# Patient Record
Sex: MALE | Race: WHITE | NOT HISPANIC OR LATINO
[De-identification: names, ages, dates, MRNs, and addresses within clinical notes are randomized per-mention and may not be internally consistent; named-entity substitution may affect disease eponyms.]

---

## 2020-07-20 PROBLEM — Z00.00 ENCOUNTER FOR PREVENTIVE HEALTH EXAMINATION: Status: ACTIVE | Noted: 2020-07-20

## 2020-09-03 ENCOUNTER — APPOINTMENT (OUTPATIENT)
Dept: SURGERY | Facility: CLINIC | Age: 40
End: 2020-09-03
Payer: COMMERCIAL

## 2020-09-03 VITALS
OXYGEN SATURATION: 97 % | HEIGHT: 68.5 IN | SYSTOLIC BLOOD PRESSURE: 126 MMHG | BODY MASS INDEX: 47.19 KG/M2 | HEART RATE: 107 BPM | TEMPERATURE: 97.9 F | DIASTOLIC BLOOD PRESSURE: 85 MMHG | WEIGHT: 315 LBS

## 2020-09-03 DIAGNOSIS — Z72.0 TOBACCO USE: ICD-10-CM

## 2020-09-03 DIAGNOSIS — E66.01 MORBID (SEVERE) OBESITY DUE TO EXCESS CALORIES: ICD-10-CM

## 2020-09-03 DIAGNOSIS — Z01.818 ENCOUNTER FOR OTHER PREPROCEDURAL EXAMINATION: ICD-10-CM

## 2020-09-03 DIAGNOSIS — G47.33 OBSTRUCTIVE SLEEP APNEA (ADULT) (PEDIATRIC): ICD-10-CM

## 2020-09-03 DIAGNOSIS — K21.9 GASTRO-ESOPHAGEAL REFLUX DISEASE W/OUT ESOPHAGITIS: ICD-10-CM

## 2020-09-03 PROCEDURE — 99205 OFFICE O/P NEW HI 60 MIN: CPT

## 2020-09-03 RX ORDER — ALPRAZOLAM 0.25 MG/1
0.25 TABLET ORAL
Refills: 0 | Status: ACTIVE | COMMUNITY

## 2020-09-03 NOTE — PHYSICAL EXAM
[Obese, well nourished, in no acute distress] : obese, well nourished, in no acute distress [Normal] : affect appropriate [de-identified] : Normoactive bowel sounds, no hepatosplenomegaly, no masses, non-tender. [de-identified] : Understood consultation

## 2020-09-03 NOTE — CONSULT LETTER
[Dear  ___] : Dear  [unfilled], [Consult Letter:] : I had the pleasure of evaluating your patient, [unfilled]. [Please see my note below.] : Please see my note below. [Consult Closing:] : Thank you very much for allowing me to participate in the care of this patient.  If you have any questions, please do not hesitate to contact me. [Sincerely,] : Sincerely, [FreeTextEntry3] : Jerome Ramey MD, FACS, FASMBS\par , Department of Surgery [facility]\par Director of Metabolic and Bariatric Surgery [facility]\par Director of Metabolic and Bariatric Surgery, and Robotic Minimally Invasive Surgery at [facility]

## 2020-09-03 NOTE — HISTORY OF PRESENT ILLNESS
[de-identified] : The patient is a 39 year-old morbidly obese man, 5feet 8.5 inches, 349 lbs. (BMI=52). The patient presents requesting weight loss surgery. She has been more than 80 bs. overweight for the past 10 years and is currently 75 lbs greater than his greatest weight. JESSICA has lost up to 20 lbs. on more than one occasion. \par \par The patient has tried numerous weight loss programs including keto, weight watchers & self-directed and physician supervised diets. JESSICA has not taken weight loss medication due to known side effects. \par \par The patient reports denies diabetes, has shortness of breath with exertion (walking stairs) , denies weight bearing joint pain, has lower back pain. \par JESSICA denies active airway disease + YOUSIF (uses CPAP nightly). He denies difficulty sleeping. He reports denies kidney, urinary tract disease, incontinence. He denies erectile dysfunction. He denies headache, dizziness, seizure or neurological disorders. \par He denies untreated thyroid, adrenal, pituitary disease. JESSICA has depression denies other psychiatric disorders. The patient denies gallstones, has heartburn, denies ulcers &  denies liver disease. \par He denies high blood pressure, denies high cholesterol, denies history of heart attack or stroke. He  denies anemia, denies bleeding disorders, thrombosis, clotting disorder or easy bruisability. JESSICA denies peripheral edema. \par \par

## 2020-09-03 NOTE — ASSESSMENT
[FreeTextEntry1] : The patient is a morbidly obese man, (BMI=52), with significant weight related comorbidity including: Obstructive sleep apnea; unable to lose weight and improve his co-morbid conditions with medical management including diet, exercise and weight loss medication.

## 2021-02-01 ENCOUNTER — APPOINTMENT (OUTPATIENT)
Dept: INFECTIOUS DISEASE | Facility: CLINIC | Age: 41
End: 2021-02-01

## 2021-02-02 ENCOUNTER — LABORATORY RESULT (OUTPATIENT)
Age: 41
End: 2021-02-02

## 2021-02-02 ENCOUNTER — APPOINTMENT (OUTPATIENT)
Dept: INFECTIOUS DISEASE | Facility: CLINIC | Age: 41
End: 2021-02-02
Payer: COMMERCIAL

## 2021-02-02 VITALS
RESPIRATION RATE: 16 BRPM | DIASTOLIC BLOOD PRESSURE: 77 MMHG | HEART RATE: 97 BPM | HEIGHT: 70 IN | SYSTOLIC BLOOD PRESSURE: 114 MMHG | WEIGHT: 315 LBS | BODY MASS INDEX: 45.1 KG/M2 | OXYGEN SATURATION: 95 % | TEMPERATURE: 98.7 F

## 2021-02-02 PROCEDURE — 99072 ADDL SUPL MATRL&STAF TM PHE: CPT

## 2021-02-02 PROCEDURE — 99205 OFFICE O/P NEW HI 60 MIN: CPT

## 2021-02-02 RX ORDER — MUPIROCIN 20 MG/G
2 OINTMENT TOPICAL
Qty: 3 | Refills: 3 | Status: ACTIVE | COMMUNITY
Start: 2021-02-02 | End: 1900-01-01

## 2021-02-02 RX ORDER — BUPROPION HYDROCHLORIDE 100 MG/1
TABLET, FILM COATED ORAL
Refills: 0 | Status: DISCONTINUED | COMMUNITY
End: 2021-02-02

## 2021-02-02 NOTE — PHYSICAL EXAM
[General Appearance - Alert] : alert [General Appearance - In No Acute Distress] : in no acute distress [Sclera] : the sclera and conjunctiva were normal [PERRL With Normal Accommodation] : pupils were equal in size, round, reactive to light [Extraocular Movements] : extraocular movements were intact [Outer Ear] : the ears and nose were normal in appearance [Oropharynx] : the oropharynx was normal with no thrush [Neck Cervical Mass (___cm)] : no neck mass was observed [Neck Appearance] : the appearance of the neck was normal [Jugular Venous Distention Increased] : there was no jugular-venous distention [Thyroid Diffuse Enlargement] : the thyroid was not enlarged [Auscultation Breath Sounds / Voice Sounds] : lungs were clear to auscultation bilaterally [Heart Rate And Rhythm] : heart rate was normal and rhythm regular [Heart Sounds] : normal S1 and S2 [Heart Sounds Gallop] : no gallops [Murmurs] : no murmurs [Heart Sounds Pericardial Friction Rub] : no pericardial rub [Edema] : there was no peripheral edema [No Palpable Adenopathy] : no palpable adenopathy [Musculoskeletal - Swelling] : no joint swelling [Nail Clubbing] : no clubbing  or cyanosis of the fingernails [Motor Tone] : muscle strength and tone were normal [Skin Color & Pigmentation] : normal skin color and pigmentation [] : no rash [FreeTextEntry1] : small furuncle left jaw with induration [Affect] : the affect was normal [Oriented To Time, Place, And Person] : oriented to person, place, and time

## 2021-02-02 NOTE — CONSULT LETTER
[Dear  ___] : Dear  [unfilled], [Consult Letter:] : I had the pleasure of evaluating your patient, [unfilled]. [Please see my note below.] : Please see my note below. [Consult Closing:] : Thank you very much for allowing me to participate in the care of this patient.  If you have any questions, please do not hesitate to contact me. [Sincerely,] : Sincerely, [FreeTextEntry2] : Dr Gilles Gutierrez\par [address]\[place] [address] [FreeTextEntry3] : Carlos Harrison MD, FACP, SOWMYA, AAHIVM\par Infectious Diseases\par [facility]

## 2021-02-02 NOTE — ASSESSMENT
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 The sites of colonization and the risk factors for increased bioburden were reviewed. A combination program was discussed at considerable length including use of Minocycline at the conclusion of the current tmp-sx course.\par Intranasal muproicin twice daily, dialy hibiclens showers with prolonged skin contact for prolonged antisepsis, coordinated laundering of all underclothes and towels and request that contacts undergo hibiclens showers was discussed and written material was provided. He was instructed to perfrom this protocol for 7 days out of the month for 3 consecutive months.\par \par lab testing including white blood cell count, Hgb A1C, HIV screen is being done to assess for medical factors increasing staph colonization. Continued weight loss was encouraged. [Treatment Education] : treatment education [Rx Dose / Side Effects] : Rx dose/side effects [Risk Reduction] : risk reduction [Medical Care Issues] : medical care issues

## 2021-02-02 NOTE — HISTORY OF PRESENT ILLNESS
[FreeTextEntry1] : This pleasant 40 year old man has been in good health except for obesity.\par remote history of pilonidal cyst\par He currently weighs 339#  BMI = 48.64 - He loast 10# since 9/3/20. 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 He had increased wbc - required I&D and was hospitalized at [facility] for about 1 week on iv antibiotics.\par \par 12/25/20 _ recurrent abscess on scrotum. I&D yielded MRSA Vanco NAI= 1 Susc to clinda, TMP-Sx,Tetra, Linezolid. A few GNRs were present. He was treated with Cefepime and LInezolid and discharged on po Cefpodoxime/Linezolid.\par \par On about 1/22/21, he developed painful right axillary abscess. He underwent drainage at [facility] and is at the end of course of Trimethoprim-Sulfa\par \par He is concerned about follicultis at right jaw. He is good humored and without systemic complaints.

## 2021-02-03 LAB
ALBUMIN SERPL ELPH-MCNC: 4.6 G/DL
ALP BLD-CCNC: 73 U/L
ALT SERPL-CCNC: 32 U/L
ANION GAP SERPL CALC-SCNC: 14 MMOL/L
AST SERPL-CCNC: 20 U/L
BASOPHILS # BLD AUTO: 0.12 K/UL
BASOPHILS NFR BLD AUTO: 0.9 %
BILIRUB SERPL-MCNC: 0.2 MG/DL
BUN SERPL-MCNC: 13 MG/DL
CALCIUM SERPL-MCNC: 9.6 MG/DL
CHLORIDE SERPL-SCNC: 102 MMOL/L
CO2 SERPL-SCNC: 24 MMOL/L
CREAT SERPL-MCNC: 1.31 MG/DL
EOSINOPHIL # BLD AUTO: 0.35 K/UL
EOSINOPHIL NFR BLD AUTO: 2.6 %
ESTIMATED AVERAGE GLUCOSE: 120 MG/DL
GLUCOSE SERPL-MCNC: 93 MG/DL
HBA1C MFR BLD HPLC: 5.8 %
HCT VFR BLD CALC: 45.6 %
HGB BLD-MCNC: 15.1 G/DL
HIV1+2 AB SPEC QL IA.RAPID: NONREACTIVE
LYMPHOCYTES # BLD AUTO: 2.8 K/UL
LYMPHOCYTES NFR BLD AUTO: 21 %
MAN DIFF?: NORMAL
MCHC RBC-ENTMCNC: 30.4 PG
MCHC RBC-ENTMCNC: 33.1 GM/DL
MCV RBC AUTO: 91.9 FL
MONOCYTES # BLD AUTO: 0.59 K/UL
MONOCYTES NFR BLD AUTO: 4.4 %
NEUTROPHILS # BLD AUTO: 8.88 K/UL
NEUTROPHILS NFR BLD AUTO: 66.7 %
PLATELET # BLD AUTO: 268 K/UL
POTASSIUM SERPL-SCNC: 4.3 MMOL/L
PROT SERPL-MCNC: 7.8 G/DL
RBC # BLD: 4.96 M/UL
RBC # FLD: 13.6 %
SARS-COV-2 IGG SERPL IA-ACNC: 0.07 INDEX
SARS-COV-2 IGG SERPL QL IA: NEGATIVE
SODIUM SERPL-SCNC: 140 MMOL/L
WBC # FLD AUTO: 13.31 K/UL

## 2021-02-17 ENCOUNTER — NON-APPOINTMENT (OUTPATIENT)
Age: 41
End: 2021-02-17

## 2021-03-17 ENCOUNTER — INPATIENT (INPATIENT)
Facility: HOSPITAL | Age: 41
LOS: 2 days | Discharge: HOME | DRG: 580 | End: 2021-03-20
Attending: INTERNAL MEDICINE | Admitting: INTERNAL MEDICINE
Payer: COMMERCIAL

## 2021-03-17 ENCOUNTER — NON-APPOINTMENT (OUTPATIENT)
Age: 41
End: 2021-03-17

## 2021-03-17 VITALS
HEIGHT: 69 IN | SYSTOLIC BLOOD PRESSURE: 158 MMHG | TEMPERATURE: 98 F | OXYGEN SATURATION: 95 % | WEIGHT: 315 LBS | RESPIRATION RATE: 16 BRPM | DIASTOLIC BLOOD PRESSURE: 81 MMHG | HEART RATE: 87 BPM

## 2021-03-17 DIAGNOSIS — L03.211 CELLULITIS OF FACE: ICD-10-CM

## 2021-03-17 LAB
ALBUMIN SERPL ELPH-MCNC: 4 G/DL — SIGNIFICANT CHANGE UP (ref 3.3–5)
ALP SERPL-CCNC: 68 U/L — SIGNIFICANT CHANGE UP (ref 40–120)
ALT FLD-CCNC: 23 U/L — SIGNIFICANT CHANGE UP (ref 10–45)
ANION GAP SERPL CALC-SCNC: 13 MMOL/L — SIGNIFICANT CHANGE UP (ref 5–17)
APTT BLD: 32 SEC — SIGNIFICANT CHANGE UP (ref 27.5–35.5)
AST SERPL-CCNC: 15 U/L — SIGNIFICANT CHANGE UP (ref 10–40)
BASOPHILS # BLD AUTO: 0.1 K/UL — SIGNIFICANT CHANGE UP (ref 0–0.2)
BASOPHILS NFR BLD AUTO: 0.8 % — SIGNIFICANT CHANGE UP (ref 0–2)
BILIRUB SERPL-MCNC: 0.5 MG/DL — SIGNIFICANT CHANGE UP (ref 0.2–1.2)
BUN SERPL-MCNC: 16 MG/DL — SIGNIFICANT CHANGE UP (ref 7–23)
CALCIUM SERPL-MCNC: 9.7 MG/DL — SIGNIFICANT CHANGE UP (ref 8.4–10.5)
CHLORIDE SERPL-SCNC: 102 MMOL/L — SIGNIFICANT CHANGE UP (ref 96–108)
CO2 SERPL-SCNC: 22 MMOL/L — SIGNIFICANT CHANGE UP (ref 22–31)
CREAT SERPL-MCNC: 0.96 MG/DL — SIGNIFICANT CHANGE UP (ref 0.5–1.3)
EOSINOPHIL # BLD AUTO: 0.2 K/UL — SIGNIFICANT CHANGE UP (ref 0–0.5)
EOSINOPHIL NFR BLD AUTO: 1.7 % — SIGNIFICANT CHANGE UP (ref 0–6)
GLUCOSE SERPL-MCNC: 114 MG/DL — HIGH (ref 70–99)
HCT VFR BLD CALC: 42.4 % — SIGNIFICANT CHANGE UP (ref 39–50)
HGB BLD-MCNC: 14.8 G/DL — SIGNIFICANT CHANGE UP (ref 13–17)
IMM GRANULOCYTES NFR BLD AUTO: 0.4 % — SIGNIFICANT CHANGE UP (ref 0–1.5)
INR BLD: 1.07 RATIO — SIGNIFICANT CHANGE UP (ref 0.88–1.16)
LYMPHOCYTES # BLD AUTO: 18.6 % — SIGNIFICANT CHANGE UP (ref 13–44)
LYMPHOCYTES # BLD AUTO: 2.19 K/UL — SIGNIFICANT CHANGE UP (ref 1–3.3)
MCHC RBC-ENTMCNC: 31 PG — SIGNIFICANT CHANGE UP (ref 27–34)
MCHC RBC-ENTMCNC: 34.9 GM/DL — SIGNIFICANT CHANGE UP (ref 32–36)
MCV RBC AUTO: 88.9 FL — SIGNIFICANT CHANGE UP (ref 80–100)
MONOCYTES # BLD AUTO: 1.5 K/UL — HIGH (ref 0–0.9)
MONOCYTES NFR BLD AUTO: 12.7 % — SIGNIFICANT CHANGE UP (ref 2–14)
NEUTROPHILS # BLD AUTO: 7.73 K/UL — HIGH (ref 1.8–7.4)
NEUTROPHILS NFR BLD AUTO: 65.8 % — SIGNIFICANT CHANGE UP (ref 43–77)
NRBC # BLD: 0 /100 WBCS — SIGNIFICANT CHANGE UP (ref 0–0)
PLATELET # BLD AUTO: 213 K/UL — SIGNIFICANT CHANGE UP (ref 150–400)
POTASSIUM SERPL-MCNC: 4 MMOL/L — SIGNIFICANT CHANGE UP (ref 3.5–5.3)
POTASSIUM SERPL-SCNC: 4 MMOL/L — SIGNIFICANT CHANGE UP (ref 3.5–5.3)
PROT SERPL-MCNC: 7.6 G/DL — SIGNIFICANT CHANGE UP (ref 6–8.3)
PROTHROM AB SERPL-ACNC: 12.8 SEC — SIGNIFICANT CHANGE UP (ref 10.6–13.6)
RBC # BLD: 4.77 M/UL — SIGNIFICANT CHANGE UP (ref 4.2–5.8)
RBC # FLD: 12.7 % — SIGNIFICANT CHANGE UP (ref 10.3–14.5)
SARS-COV-2 RNA SPEC QL NAA+PROBE: SIGNIFICANT CHANGE UP
SODIUM SERPL-SCNC: 137 MMOL/L — SIGNIFICANT CHANGE UP (ref 135–145)
WBC # BLD: 11.77 K/UL — HIGH (ref 3.8–10.5)
WBC # FLD AUTO: 11.77 K/UL — HIGH (ref 3.8–10.5)

## 2021-03-17 PROCEDURE — 70487 CT MAXILLOFACIAL W/DYE: CPT | Mod: 26,MA

## 2021-03-17 PROCEDURE — 99285 EMERGENCY DEPT VISIT HI MDM: CPT

## 2021-03-17 PROCEDURE — 99222 1ST HOSP IP/OBS MODERATE 55: CPT

## 2021-03-17 RX ORDER — VANCOMYCIN HCL 1 G
1250 VIAL (EA) INTRAVENOUS ONCE
Refills: 0 | Status: COMPLETED | OUTPATIENT
Start: 2021-03-17 | End: 2021-03-17

## 2021-03-17 RX ORDER — VANCOMYCIN HCL 1 G
1250 VIAL (EA) INTRAVENOUS EVERY 8 HOURS
Refills: 0 | Status: DISCONTINUED | OUTPATIENT
Start: 2021-03-17 | End: 2021-03-18

## 2021-03-17 RX ORDER — KETOROLAC TROMETHAMINE 30 MG/ML
15 SYRINGE (ML) INJECTION ONCE
Refills: 0 | Status: DISCONTINUED | OUTPATIENT
Start: 2021-03-17 | End: 2021-03-17

## 2021-03-17 RX ORDER — OXYCODONE AND ACETAMINOPHEN 5; 325 MG/1; MG/1
1 TABLET ORAL EVERY 6 HOURS
Refills: 0 | Status: DISCONTINUED | OUTPATIENT
Start: 2021-03-17 | End: 2021-03-19

## 2021-03-17 RX ORDER — INFLUENZA VIRUS VACCINE 15; 15; 15; 15 UG/.5ML; UG/.5ML; UG/.5ML; UG/.5ML
0.5 SUSPENSION INTRAMUSCULAR ONCE
Refills: 0 | Status: DISCONTINUED | OUTPATIENT
Start: 2021-03-17 | End: 2021-03-20

## 2021-03-17 RX ORDER — MORPHINE SULFATE 50 MG/1
2 CAPSULE, EXTENDED RELEASE ORAL EVERY 6 HOURS
Refills: 0 | Status: DISCONTINUED | OUTPATIENT
Start: 2021-03-17 | End: 2021-03-18

## 2021-03-17 RX ORDER — MORPHINE SULFATE 50 MG/1
4 CAPSULE, EXTENDED RELEASE ORAL ONCE
Refills: 0 | Status: DISCONTINUED | OUTPATIENT
Start: 2021-03-17 | End: 2021-03-17

## 2021-03-17 RX ORDER — SODIUM CHLORIDE 9 MG/ML
1000 INJECTION INTRAMUSCULAR; INTRAVENOUS; SUBCUTANEOUS ONCE
Refills: 0 | Status: COMPLETED | OUTPATIENT
Start: 2021-03-17 | End: 2021-03-17

## 2021-03-17 RX ORDER — VANCOMYCIN HCL 1 G
VIAL (EA) INTRAVENOUS
Refills: 0 | Status: DISCONTINUED | OUTPATIENT
Start: 2021-03-17 | End: 2021-03-18

## 2021-03-17 RX ADMIN — Medication 15 MILLIGRAM(S): at 18:02

## 2021-03-17 RX ADMIN — Medication 100 MILLIGRAM(S): at 12:25

## 2021-03-17 RX ADMIN — MORPHINE SULFATE 2 MILLIGRAM(S): 50 CAPSULE, EXTENDED RELEASE ORAL at 19:43

## 2021-03-17 RX ADMIN — Medication 15 MILLIGRAM(S): at 16:14

## 2021-03-17 RX ADMIN — MORPHINE SULFATE 4 MILLIGRAM(S): 50 CAPSULE, EXTENDED RELEASE ORAL at 15:45

## 2021-03-17 RX ADMIN — Medication 600 MILLIGRAM(S): at 13:55

## 2021-03-17 RX ADMIN — Medication 166.67 MILLIGRAM(S): at 16:14

## 2021-03-17 RX ADMIN — Medication 1250 MILLIGRAM(S): at 18:02

## 2021-03-17 RX ADMIN — SODIUM CHLORIDE 1000 MILLILITER(S): 9 INJECTION INTRAMUSCULAR; INTRAVENOUS; SUBCUTANEOUS at 12:25

## 2021-03-17 RX ADMIN — OXYCODONE AND ACETAMINOPHEN 1 TABLET(S): 5; 325 TABLET ORAL at 21:32

## 2021-03-17 RX ADMIN — MORPHINE SULFATE 4 MILLIGRAM(S): 50 CAPSULE, EXTENDED RELEASE ORAL at 13:01

## 2021-03-17 RX ADMIN — Medication 166.67 MILLIGRAM(S): at 23:57

## 2021-03-17 NOTE — ED ADULT NURSE REASSESSMENT NOTE - NS ED NURSE REASSESS COMMENT FT1
Report received from Ewelina RN. Upon assessment, pt is AO x 4, speaking in full and complete sentences. Pt c/o head pressure and 9/10 facial pain. Morphine to be given. Pt updated on plan of care, awaiting inpatient bed. fall and safety precautions in place, pt in view of nurse's station.

## 2021-03-17 NOTE — ED PROVIDER NOTE - ATTENDING CONTRIBUTION TO CARE
Attending Statement (SARAH Porras MD):    HPI: 39 yo M with h/o obesity, recurrent skin/soft tissue infections and abscesses, and positive for MRSA colonization (follows with ID, Dr ZAMZAM Harrison), who presents for worsening pain, redness and swelling of the right face for the past 3 days; no fever/chills, no nausea/vomiting, no vision changes/double vision, no ear pain, no jaw pain, no difficulty opening/closing mouth, no difficulty speaking/swallowing.  States began 3 days ago with small pimple next to left side of nose, that he picked and drained small amount of puss, next day began having increasing redness; worsening today, prompting visit to ED      Review of Systems:  -General: no fever +chills  -ENT: no congestion, no difficulty swallowing (see hpi)  -Pulmonary: no cough, no shortness of breath  -Cardiac: no chest pain, no palpitations  -Gastrointestinal: no abdominal pain, no nausea, no vomiting, and no diarrhea.  -Genitourinary: no blood or pain with urination  -Musculoskeletal: no back or neck pain  -Skin:  see hpi  -Endocrine: No h/o diabetes or thyroid disease  -Neurologic: No focal weakness or numbness    All else negative unless otherwise specified elsewhere in this note.    PSH/PMH as noted above    On Physical Exam:  General: well appearing, in NAD, speaking clearly in full sentences and without difficulty; cooperative with exam  HEENT: PERRL, MMM; pustule noted bilateral to nose with area of erythema, induration and tenderness superior to pustule on R side of face with extending erythema up to periorbital area. posterior pharynx clear  Neck: no neck tenderness, no nuchal rigidity  Cardiac: normal s1, s2; RRR; no MGR  Lungs: CTABL  Abdomen: soft nontender/nondistended  : no bladder tenderness or distension  Skin: intact, no rash  Extremities: no peripheral edema, no gross deformities  Neuro: no gross neurologic deficits; EOMI without eliciting pain or diplopia    MDM: Concern for facial cellulitis; Attending Statement (SARAH Porras MD):    HPI: 41 yo M with h/o obesity, recurrent skin/soft tissue infections and abscesses, and positive for MRSA colonization (follows with ID, Dr ZAMZAM Harrison), who presents for worsening pain, redness and swelling of the right face for the past 3 days; no fever/chills, no nausea/vomiting, no vision changes/double vision, no ear pain, no jaw pain, no difficulty opening/closing mouth, no difficulty speaking/swallowing.  States began 3 days ago with small pimple next to left side of nose, that he picked and drained small amount of puss, next day began having increasing redness; worsening today, prompting visit to ED      Review of Systems:  -General: no fever +chills  -ENT: no congestion, no difficulty swallowing (see hpi)  -Pulmonary: no cough, no shortness of breath  -Cardiac: no chest pain, no palpitations  -Gastrointestinal: no abdominal pain, no nausea, no vomiting, and no diarrhea.  -Genitourinary: no blood or pain with urination  -Musculoskeletal: no back or neck pain  -Skin:  see hpi  -Endocrine: No h/o diabetes or thyroid disease  -Neurologic: No focal weakness or numbness    All else negative unless otherwise specified elsewhere in this note.    PSH/PMH as noted above    On Physical Exam:  General: well appearing, in NAD, speaking clearly in full sentences and without difficulty; cooperative with exam  HEENT: PERRL, MMM; pustule noted bilateral to nose with area of erythema, induration and tenderness superior to pustule on R side of face with extending erythema up to periorbital area. posterior pharynx clear  Neck: no neck tenderness, no nuchal rigidity  Cardiac: normal s1, s2; RRR; no MGR  Lungs: CTABL  Abdomen: soft nontender/nondistended  : no bladder tenderness or distension  Skin: intact, no rash  Extremities: no peripheral edema, no gross deformities  Neuro: no gross neurologic deficits; EOMI without eliciting pain or diplopia    MDM: Concern for facial cellulitis; likely MRSA given history; will obtain screening labs: cbc (to evaluate for leukocytosis or anemia), CMP (to evaluate for electrolyte abnormalities or renal/liver dysfunction) and blood cultures (low suspicion for sepsis or bacteremia given well appearing and afebrile, but is reporting chills).  Will also obtain CT max/face with iv contrast given swelling to evaluate for evidence of underlying abscess or osteomyelitis.  Start clindamycin empirically, and will consult with his ID physician for further recommendations; disposition pending results of labs/imaging and d/w ID.  -see progress notes above for updates to ED course and MDM.

## 2021-03-17 NOTE — ED PROVIDER NOTE - PHYSICAL EXAMINATION
GEN: Well Appearing, Nontoxic, NAD  HEENT: NC/AT, Symm Facies. pustule noted bilateral to nose with area of erythema, induration and tenderness superior to pustule on R side of face with extending erythema up to periorbital area. PERRL, EOMI, MMM, posterior pharynx clear  CV: No JVD/Bruits or stridor;  +S1S2, RRR w/o m/g/r  RESP: CTAB w/o w/r/r  ABD: Soft, nt/nd, +BS. No guarding/rebound. No RUQ tender, no CVAT  EXT/MSK: No lower extremity edema or calf tenderness. WWP, palpable pulses. FROMx4  SKIN: No erythema, lesions or rash  Neuro: Grossly intact, AOX3 with normal speech, CN II-XII intact; Sensation intact, motor 5/5 throughout. Gait normal

## 2021-03-17 NOTE — ED ADULT NURSE NOTE - CHIEF COMPLAINT QUOTE
Dx MRSA cellulitis s/p surgeries (at [facility]) to R groin (11/2020) and scrotum (12/25/2020), finished Cefpodoxime, Linezolid and Flagyl in January.  Then switched care to [facility] ID Dr. Perkins and started on Minocycline x 7 days for MRSA bacteremia (finished 3 weeks ago)  Here now for new swelling, redness to R face

## 2021-03-17 NOTE — ED ADULT TRIAGE NOTE - CHIEF COMPLAINT QUOTE
Dx MRSA cellulitis s/p surgeries (at [facility]) to R groin (11/2020) and scrotum (12/25/2020), finished Cefpodoxime, Linezolid and Flagyl in January.  Then switched care to [facility] ID Dr. Perkins and started on Minocycline x 7 days for MRSA bacteremia (finished 3 weeks ago)  Here now for new swelling, redness to R face

## 2021-03-17 NOTE — CONSULT NOTE ADULT - SUBJECTIVE AND OBJECTIVE BOX
Patient is a 40y old  Male who presents with a chief complaint of   HPI: 41yo M PMHx MRSA colonization (s/p minocycline x7d, on mupirocin nasal ointment and chlorhexidine baths), BMI 50 p/w spreading facial abscesses. Pt reports a pimple appeared on 3/13 on the L side of his nose. The pimple "popped and drained" but the pt noticed that more pimples were appearing. Since 3/13, he has developed two new pimples (one on each side of his nose). This morning, he noted that the pimple on the R side of his nose spread to his cheek and continues to spread cranially towards his eye. Denies photophobia, pain with eye movements, diplopia. Endorses mild headache       prior hospital charts reviewed [ x ]  primary team notes reviewed [ x ]  other consultant notes reviewed [ x ]    PAST MEDICAL & SURGICAL HISTORY:      Allergies  No Known Allergies    ANTIMICROBIALS (past 90 days)  MEDICATIONS  (STANDING):  clindamycin IVPB   100 mL/Hr IV Intermittent (03-17-21 @ 12:25)          OTHER MEDS: MEDICATIONS  (STANDING):    SOCIAL HISTORY:       FAMILY HISTORY:    REVIEW OF SYSTEMS  [  ] ROS unobtainable because:    [  ] All other systems negative except as noted below:	    Constitutional:  [ ] fever [ ] chills  [ ] weight loss  [ ] weakness  Skin:  [ ] rash [ ] phlebitis	  Eyes: [ ] icterus [ ] pain  [ ] discharge	  ENMT: [ ] sore throat  [ ] thrush [ ] ulcers [ ] exudates  Respiratory: [ ] dyspnea [ ] hemoptysis [ ] cough [ ] sputum	  Cardiovascular:  [ ] chest pain [ ] palpitations [ ] edema	  Gastrointestinal:  [ ] nausea [ ] vomiting [ ] diarrhea [ ] constipation [ ] pain	  Genitourinary:  [ ] dysuria [ ] frequency [ ] hematuria [ ] discharge [ ] flank pain  [ ] incontinence  Musculoskeletal:  [ ] myalgias [ ] arthralgias [ ] arthritis  [ ] back pain  Neurological:  [ ] headache [ ] seizures  [ ] confusion/altered mental status  Psychiatric:  [ ] anxiety [ ] depression	  Hematology/Lymphatics:  [ ] lymphadenopathy  Endocrine:  [ ] adrenal [ ] thyroid  Allergic/Immunologic:	 [ ] transplant [ ] seasonal    Vital Signs Last 24 Hrs  T(F): 98.7 (03-17-21 @ 11:47), Max: 98.7 (03-17-21 @ 11:47)  Vital Signs Last 24 Hrs  HR: 77 (03-17-21 @ 13:00) (77 - 87)  BP: 122/60 (03-17-21 @ 13:00) (121/63 - 158/81)  RR: 18 (03-17-21 @ 13:00)  SpO2: 98% (03-17-21 @ 13:00) (95% - 98%)  Wt(kg): --    PHYSICAL EXAM:  Constitutional: non-toxic, no distress  HEAD/EYES: anicteric, no conjunctival injection  ENT:  supple, no thrush  Cardiovascular:   normal S1, S2, no murmur, no edema  Respiratory:  clear BS bilaterally, no wheezes, no rales  GI:  soft, non-tender, normal bowel sounds  :  no hopkins, no CVA tenderness  Musculoskeletal:  no synovitis, normal ROM  Neurologic: awake and alert, normal strength, no focal findings  Skin:  no rash, no erythema, no phlebitis  Heme/Onc: no lymphadenopathy   Psychiatric:  awake, alert, appropriate mood                          14.8   11.77 )-----------( 213      ( 17 Mar 2021 12:19 )             42.4   03-17    137  |  102  |  16  ----------------------------<  114<H>  4.0   |  22  |  0.96    Ca    9.7      17 Mar 2021 12:19    TPro  7.6  /  Alb  4.0  /  TBili  0.5  /  DBili  x   /  AST  15  /  ALT  23  /  AlkPhos  68  03-17    MICROBIOLOGY:              RADIOLOGY:  imaging below personally reviewed and agree with findings Patient is a 40y old  Male who presents with a chief complaint of facial abscess.     HPI: 41yo M PMHx MRSA colonization (s/p minocycline x7d, on mupirocin nasal ointment and chlorhexidine baths), BMI 50 p/w spreading facial abscesses. Pt reports a pimple appeared on 3/13 on the L side of his nose. The pimple "popped and drained" but the pt noticed that more pimples were appearing. Since 3/13, he has developed two new pimples (one on each side of his nose). This morning, he noted that the pimple on the R side of his nose spread to his cheek and continues to spread towards his eye. Endorses headache and dental pain due to the pressure but denies photophobia, diplopia, or pain with eye movements. Denies fever, chills, cough, SOB, CP, abdominal pain, N/V, diarrhea, dysuria.    Per outpt notes, MRSA Vanco NAI=1 Susc to clindamycin, TMP-SMX, tetracycline, linezolid.  MRSA Vanco NAI= 1 Susc to clinda, TMP-Sx,Tetra, Linezolid.    prior hospital charts reviewed [ x ]  primary team notes reviewed [ x ]  other consultant notes reviewed [ x ]    PAST MEDICAL & SURGICAL HISTORY:      Allergies  No Known Allergies    ANTIMICROBIALS (past 90 days)  MEDICATIONS  (STANDING):  clindamycin IVPB   100 mL/Hr IV Intermittent (03-17-21 @ 12:25)          OTHER MEDS: MEDICATIONS  (STANDING):    SOCIAL HISTORY:       FAMILY HISTORY:    REVIEW OF SYSTEMS  [  ] ROS unobtainable because:    [  ] All other systems negative except as noted below:	    Constitutional:  [ ] fever [ ] chills  [ ] weight loss  [ ] weakness  Skin:  [ ] rash [ ] phlebitis	  Eyes: [ ] icterus [ ] pain  [ ] discharge	  ENMT: [ ] sore throat  [ ] thrush [ ] ulcers [ ] exudates  Respiratory: [ ] dyspnea [ ] hemoptysis [ ] cough [ ] sputum	  Cardiovascular:  [ ] chest pain [ ] palpitations [ ] edema	  Gastrointestinal:  [ ] nausea [ ] vomiting [ ] diarrhea [ ] constipation [ ] pain	  Genitourinary:  [ ] dysuria [ ] frequency [ ] hematuria [ ] discharge [ ] flank pain  [ ] incontinence  Musculoskeletal:  [ ] myalgias [ ] arthralgias [ ] arthritis  [ ] back pain  Neurological:  [ ] headache [ ] seizures  [ ] confusion/altered mental status  Psychiatric:  [ ] anxiety [ ] depression	  Hematology/Lymphatics:  [ ] lymphadenopathy  Endocrine:  [ ] adrenal [ ] thyroid  Allergic/Immunologic:	 [ ] transplant [ ] seasonal    Vital Signs Last 24 Hrs  T(F): 98.7 (03-17-21 @ 11:47), Max: 98.7 (03-17-21 @ 11:47)  Vital Signs Last 24 Hrs  HR: 77 (03-17-21 @ 13:00) (77 - 87)  BP: 122/60 (03-17-21 @ 13:00) (121/63 - 158/81)  RR: 18 (03-17-21 @ 13:00)  SpO2: 98% (03-17-21 @ 13:00) (95% - 98%)  Wt(kg): --    PHYSICAL EXAM:  Constitutional: non-toxic, no distress  HEAD/EYES: anicteric, no conjunctival injection  ENT:  supple, no thrush  Cardiovascular:   normal S1, S2, no murmur, no edema  Respiratory:  clear BS bilaterally, no wheezes, no rales  GI:  soft, non-tender, normal bowel sounds  :  no hopkins, no CVA tenderness  Musculoskeletal:  no synovitis, normal ROM  Neurologic: awake and alert, normal strength, no focal findings  Skin:  no rash, no erythema, no phlebitis  Heme/Onc: no lymphadenopathy   Psychiatric:  awake, alert, appropriate mood                          14.8   11.77 )-----------( 213      ( 17 Mar 2021 12:19 )             42.4   03-17    137  |  102  |  16  ----------------------------<  114<H>  4.0   |  22  |  0.96    Ca    9.7      17 Mar 2021 12:19    TPro  7.6  /  Alb  4.0  /  TBili  0.5  /  DBili  x   /  AST  15  /  ALT  23  /  AlkPhos  68  03-17    MICROBIOLOGY:              RADIOLOGY:  imaging below personally reviewed and agree with findings Patient is a 40y old  Male who presents with a chief complaint of facial abscess.     HPI: 39yo M PMHx MRSA colonization (s/p minocycline x7d, on mupirocin nasal ointment and chlorhexidine baths), BMI 50 p/w spreading facial abscesses. Pt reports a pimple appeared on 3/13 on the L side of his nose. The pimple "popped and drained" but the pt noticed that more pimples were appearing. Since 3/13, he has developed two new pimples (one on each side of his nose). This morning, he noted that the pimple on the R side of his nose spread to his cheek and continues to spread towards his eye. Endorses headache and dental pain due to the pressure but denies photophobia, diplopia, or pain with eye movements. Denies fever, chills, cough, SOB, CP, abdominal pain, N/V, diarrhea, dysuria. Pt never had abscesses prior to Nov 2020. Lives with his wife and [id number] son, neither of whom exhibit similar sxs.     Pt currently follows with Dr. Carlos Harrison in outpt ID. Per Dr. Harrison's note, sensitivities from [facility] are: MRSA Vanco NAI=1 Susc to clindamycin, TMP-SMX, tetracycline, linezolid.  MRSA Vanco NAI= 1 Susc to clinda, TMP-Sx,Tetra, Linezolid.    On arrival, T98 /81 HR87 SaO2 95% on RA. WBC 11.77. Pt received clinda x1 and morphine x1. Blood ctx pending.     prior hospital charts reviewed [ x ]  primary team notes reviewed [ x ]  other consultant notes reviewed [ x ]    PAST MEDICAL & SURGICAL HISTORY:    Allergies  No Known Allergies    ANTIMICROBIALS (past 90 days)  MEDICATIONS  (STANDING):  clindamycin IVPB   100 mL/Hr IV Intermittent (03-17-21 @ 12:25)    OTHER MEDS: MEDICATIONS  (STANDING):    SOCIAL HISTORY: Works in import/export of horses. Limited interaction with horses and mostly works from home now. Lives with wife and son at home. Former cigarette smoker (since 17yo ~0.5pack/day), currently vapes. Social alcohol use. No drug use.     FAMILY HISTORY:    REVIEW OF SYSTEMS  [  ] ROS unobtainable because:    [ x ] All other systems negative except as noted below:	    Constitutional:  [ ] fever [ ] chills  [ ] weight loss  [ ] weakness  Skin:  [x] rash [ ] phlebitis	  Eyes: [ ] icterus [ ] pain  [ ] discharge	  ENMT: [ ] sore throat  [ ] thrush [ ] ulcers [ ] exudates  Respiratory: [ ] dyspnea [ ] hemoptysis [ ] cough [ ] sputum	  Cardiovascular:  [ ] chest pain [ ] palpitations [ ] edema	  Gastrointestinal:  [ ] nausea [ ] vomiting [ ] diarrhea [ ] constipation [ ] pain	  Genitourinary:  [ ] dysuria [ ] frequency [ ] hematuria [ ] discharge [ ] flank pain  [ ] incontinence  Musculoskeletal:  [ ] myalgias [ ] arthralgias [ ] arthritis  [ ] back pain  Neurological:  [ ] headache [ ] seizures  [ ] confusion/altered mental status  Psychiatric:  [ ] anxiety [ ] depression	  Hematology/Lymphatics:  [ ] lymphadenopathy  Endocrine:  [ ] adrenal [ ] thyroid  Allergic/Immunologic:	 [ ] transplant [ ] seasonal    Vital Signs Last 24 Hrs  T(F): 98.7 (03-17-21 @ 11:47), Max: 98.7 (03-17-21 @ 11:47)  Vital Signs Last 24 Hrs  HR: 77 (03-17-21 @ 13:00) (77 - 87)  BP: 122/60 (03-17-21 @ 13:00) (121/63 - 158/81)  RR: 18 (03-17-21 @ 13:00)  SpO2: 98% on RA (03-17-21 @ 13:00) (95% - 98%)    PHYSICAL EXAM:  Constitutional: Awake, interactive, non-toxic, no acute distress, lying in hospital stretcher  HEAD/EYES: +erythematous, indurated patch overlying R cheek extending towards lower R eyelid. Two pustules noted on each side of the nose. No eyelid involvement, no chemosis, no proptosis. No conjunctival injection. Anicteric.   ENT: Slight tenderness to palpation of R submandibular lymph nodes. Lymphadenopathy was difficult to appreciate. No dental abnormalities  Cardiovascular: Regular rate and rhythm. S1, S2. No murmurs  Respiratory:  Clear BS bilaterally, no wheezes, no rales  GI:  Soft, non-tender, non-distended  : No Gauthier, no CVA tenderness  Neurologic: A&O x4  Skin: See description of face above. PIV x1 in L hand. No phlebitis. Tattoos on b/l upper and lower extremities.                           14.8   11.77 )-----------( 213      ( 17 Mar 2021 12:19 )             42.4   03-17    137  |  102  |  16  ----------------------------<  114<H>  4.0   |  22  |  0.96    Ca    9.7      17 Mar 2021 12:19    TPro  7.6  /  Alb  4.0  /  TBili  0.5  /  DBili  x   /  AST  15  /  ALT  23  /  AlkPhos  68  03-17      RADIOLOGY:  imaging below personally reviewed and agree with findings    c< from: CT Maxillofacial w/ IV Cont (03.17.21 @ 15:21) >  FINDINGS:    There is anterior infraorbital soft tissue thickening with some mild adjacent inflammatory stranding stenting down to the mandible. No abscess. There is no adjacent bone erosion. There is no post septal involvement in the orbit.    Evaluation of the osseous structures demonstrates no evidence of fracture.    The visualized sinuses demonstrate no evidence of opacification or mucosal thickening.    The nasopharynx is normal in appearance.    The parotid glands are normal and symmetric in appearance.    The  space is normal bilaterally.    Parapharyngeal space is normal bilaterally.    The tongue base is normal in appearance.    The epiglottis isnormal in thickness and appearance.    Worth tonsils are normal in appearance.    Sternocleidomastoid muscles are normal and symmetric in appearance bilaterally where visualized.    No suspicious adenopathy.      IMPRESSION:    There is a mild anterior intraorbital soft tissue thickening with mild adjacent inflammatory stranding extending down to the level of the mandible. No abscess. No adjacent bone erosion.    < end of copied text >

## 2021-03-17 NOTE — ED PROVIDER NOTE - OBJECTIVE STATEMENT
39 y/o M pmhx obesity, remote history of pilonidal cyst, recurrent abscess on scrotum. I&D yielded MRSA (Vanco NAI= 1 Susc to clinda, TMP-Sx,Tetra, Linezolid. A few GNRs were present. He was treated with Cefepime and LInezolid and discharged on po Cefpodoxime/Linezolid), 1/22/21 developed axillary abscess treated with bactrim, and most recently treated with minocycline for groin abscess. 41 y/o M pmhx obesity, remote history of pilonidal cyst, recurrent abscess on scrotum presenting with concern for facial infection worsening for 3 days. Patient underwent I&D of his scrotal abscess which yielded MRSA (Vanco NAI= 1 Susc to clinda, TMP-Sx,Tetra, Linezolid. A few GNRs were present,  treated with Cefepime and LInezolid and discharged on po Cefpodoxime/Linezolid), 1/22/21 developed axillary abscess treated with bactrim, and most recently established care with Dr. Escalona, treated with minocycline, intranasal mupirocin and daily hibiclens showers for colonization of MRSA (instructed to perform for 7 days /mo for 3 months). Patient reports that 3 days ago he noticed swelling and pimple developing on the L side of his nose; he picked at the area and drained small amount of pus from it and started to resolve. Yesterday noticed another pimple developing in the same location next to his nose on the right side, which he also picked at and tried to drain and then this morning woke up with significant swelling and area that is hard and red above this pimple. He reports significant pain to the touch of the area and this morning swelling was significant enough that it was making it difficult for him to open his eye. He reports this is much better now than it was, has not taken any NSAIDs or any medication for pain. Last night had temp of 99.4 and chills, and when noted the area to be worsening this morning called Dr. Harrison's office and was advised to come to the ER.

## 2021-03-17 NOTE — H&P ADULT - ASSESSMENT
41 y/o M pmhx obesity, remote history of pilonidal cyst, recurrent abscess on scrotum presenting with concern for facial infection worsening for 3 days. Patient underwent I&D of his scrotal abscess which yielded MRSA (Vanco NAI= 1 Susc to clinda, TMP-Sx,Tetra, Linezolid. A few GNRs were present,  treated with Cefepime and LInezolid and discharged on po Cefpodoxime/Linezolid), 1/22/21 developed axillary abscess treated with bactrim, and most recently established care with Dr. Escalona, treated with minocycline, intranasal mupirocin and daily hibiclens showers for colonization of MRSA (instructed to perform for 7 days /mo for 3 months). Patient reports that 3 days ago he noticed swelling and pimple developing on the L side of his nose; he picked at the area and drained small amount of pus from it and started to resolve. Yesterday noticed another pimple developing in the same location next to his nose on the right side, which he also picked at and tried to drain and then this morning woke up with significant swelling and area that is hard and red above this pimple. He reports significant pain to the touch of the area and this morning swelling was significant enough that it was making it difficult for him to open his eye. He reports this is much better now than it was, has not taken any NSAIDs or any medication for pain. Last night had temp of 99.4 and chills, and when noted the area to be worsening this morning so was advised to go to ER by his infectious disease doctor .

## 2021-03-17 NOTE — H&P ADULT - SKIN COMMENTS
----- Message from SALMA Pack sent at 9/10/2019  9:56 PM CDT -----  Labs will be discussed at upcoming appointment.     Cholesterol improved  Blood sugar normal  Thyroid function normal  Kidney function normal  Blood counts normal     right facial swelling with erythema ,tenderness and warm to touch

## 2021-03-17 NOTE — H&P ADULT - PROBLEM SELECTOR PLAN 1
With H/O Recurrent MRSA infection . ID help appreciated . Started on IV Vancomycin. Blood cultures and Nasal swab results pending. Pain meds . Will check HgbA1c .

## 2021-03-17 NOTE — ED PROVIDER NOTE - PROGRESS NOTE DETAILS
ID saw and evaluated patient recommending vancomycin and admission to the hospital. -Norma Sinha PA-C

## 2021-03-17 NOTE — H&P ADULT - HISTORY OF PRESENT ILLNESS
39 y/o M pmhx obesity, remote history of pilonidal cyst, recurrent abscess on scrotum presenting with concern for facial infection worsening for 3 days. Patient underwent I&D of his scrotal abscess which yielded MRSA (Vanco NAI= 1 Susc to clinda, TMP-Sx,Tetra, Linezolid. A few GNRs were present,  treated with Cefepime and LInezolid and discharged on po Cefpodoxime/Linezolid), 1/22/21 developed axillary abscess treated with bactrim, and most recently established care with Dr. Escalona, treated with minocycline, intranasal mupirocin and daily hibiclens showers for colonization of MRSA (instructed to perform for 7 days /mo for 3 months). Patient reports that 3 days ago he noticed swelling and pimple developing on the L side of his nose; he picked at the area and drained small amount of pus from it and started to resolve. Yesterday noticed another pimple developing in the same location next to his nose on the right side, which he also picked at and tried to drain and then this morning woke up with significant swelling and area that is hard and red above this pimple. He reports significant pain to the touch of the area and this morning swelling was significant enough that it was making it difficult for him to open his eye. He reports this is much better now than it was, has not taken any NSAIDs or any medication for pain. Last night had temp of 99.4 and chills, and when noted the area to be worsening this morning so was advised to go to ER by his infectious disease doctor .

## 2021-03-17 NOTE — ED PROVIDER NOTE - NS ED ROS FT
Constitutional: No fever +chills  Eyes: No visual changes, eye pain or redness  HEENT: No throat pain, ear pain, nasal pain. No nose bleeding.  CV: No chest pain or lower extremity edema  Resp: No SOB no cough  GI: No abd pain. No nausea or vomiting. No diarrhea. No constipation.   : No dysuria, hematuria.   MSK: No musculoskeletal pain  Skin: redness and swelling to face around nose bilaterally, with pimple in both areas  Neuro: No headache. No numbness or tingling. No weakness.

## 2021-03-17 NOTE — PHARMACOTHERAPY INTERVENTION NOTE - COMMENTS
JESSICA RENES, 40y Male with concern for facial cellulitis secondary to MRSA, history of MRSA infections, was given 1x dose of clindamycin today.    Recommendation(s):  1) Based on patients adjusted body weight ~100 kg (actual body weight 150 kg, BMI 50), would suggest giving vancomycin 1250 mg IV Q8H (patient has CrCl > 80 mL/min).  2) Would draw trough level prior to 6th consecutive vancomycin dose (if patient still here at that time)    With kind regards,  Vitaliy Lima, LisbethD  Infectious Diseases Clinical Pharmacist  .

## 2021-03-18 LAB
A1C WITH ESTIMATED AVERAGE GLUCOSE RESULT: 5.5 % — SIGNIFICANT CHANGE UP (ref 4–5.6)
ANION GAP SERPL CALC-SCNC: 15 MMOL/L — SIGNIFICANT CHANGE UP (ref 5–17)
BUN SERPL-MCNC: 14 MG/DL — SIGNIFICANT CHANGE UP (ref 7–23)
CALCIUM SERPL-MCNC: 9.2 MG/DL — SIGNIFICANT CHANGE UP (ref 8.4–10.5)
CHLORIDE SERPL-SCNC: 100 MMOL/L — SIGNIFICANT CHANGE UP (ref 96–108)
CO2 SERPL-SCNC: 22 MMOL/L — SIGNIFICANT CHANGE UP (ref 22–31)
COVID-19 SPIKE DOMAIN AB INTERP: NEGATIVE — SIGNIFICANT CHANGE UP
COVID-19 SPIKE DOMAIN ANTIBODY RESULT: 0.4 U/ML — SIGNIFICANT CHANGE UP
CREAT SERPL-MCNC: 1.12 MG/DL — SIGNIFICANT CHANGE UP (ref 0.5–1.3)
ESTIMATED AVERAGE GLUCOSE: 111 MG/DL — SIGNIFICANT CHANGE UP (ref 68–114)
GLUCOSE SERPL-MCNC: 98 MG/DL — SIGNIFICANT CHANGE UP (ref 70–99)
HCT VFR BLD CALC: 40.8 % — SIGNIFICANT CHANGE UP (ref 39–50)
HGB BLD-MCNC: 13.8 G/DL — SIGNIFICANT CHANGE UP (ref 13–17)
MCHC RBC-ENTMCNC: 30.9 PG — SIGNIFICANT CHANGE UP (ref 27–34)
MCHC RBC-ENTMCNC: 33.8 GM/DL — SIGNIFICANT CHANGE UP (ref 32–36)
MCV RBC AUTO: 91.3 FL — SIGNIFICANT CHANGE UP (ref 80–100)
MRSA PCR RESULT.: DETECTED
NRBC # BLD: 0 /100 WBCS — SIGNIFICANT CHANGE UP (ref 0–0)
PLATELET # BLD AUTO: 199 K/UL — SIGNIFICANT CHANGE UP (ref 150–400)
POTASSIUM SERPL-MCNC: 3.9 MMOL/L — SIGNIFICANT CHANGE UP (ref 3.5–5.3)
POTASSIUM SERPL-SCNC: 3.9 MMOL/L — SIGNIFICANT CHANGE UP (ref 3.5–5.3)
RBC # BLD: 4.47 M/UL — SIGNIFICANT CHANGE UP (ref 4.2–5.8)
RBC # FLD: 12.9 % — SIGNIFICANT CHANGE UP (ref 10.3–14.5)
S AUREUS DNA NOSE QL NAA+PROBE: DETECTED
SARS-COV-2 IGG+IGM SERPL QL IA: 0.4 U/ML — SIGNIFICANT CHANGE UP
SARS-COV-2 IGG+IGM SERPL QL IA: NEGATIVE — SIGNIFICANT CHANGE UP
SODIUM SERPL-SCNC: 137 MMOL/L — SIGNIFICANT CHANGE UP (ref 135–145)
VANCOMYCIN TROUGH SERPL-MCNC: <4 UG/ML — LOW (ref 10–20)
WBC # BLD: 9.75 K/UL — SIGNIFICANT CHANGE UP (ref 3.8–10.5)
WBC # FLD AUTO: 9.75 K/UL — SIGNIFICANT CHANGE UP (ref 3.8–10.5)

## 2021-03-18 PROCEDURE — 99232 SBSQ HOSP IP/OBS MODERATE 35: CPT

## 2021-03-18 PROCEDURE — 99221 1ST HOSP IP/OBS SF/LOW 40: CPT

## 2021-03-18 RX ORDER — LINEZOLID 600 MG/300ML
600 INJECTION, SOLUTION INTRAVENOUS EVERY 12 HOURS
Refills: 0 | Status: DISCONTINUED | OUTPATIENT
Start: 2021-03-19 | End: 2021-03-20

## 2021-03-18 RX ORDER — MORPHINE SULFATE 50 MG/1
4 CAPSULE, EXTENDED RELEASE ORAL EVERY 6 HOURS
Refills: 0 | Status: DISCONTINUED | OUTPATIENT
Start: 2021-03-18 | End: 2021-03-19

## 2021-03-18 RX ORDER — LINEZOLID 600 MG/300ML
600 INJECTION, SOLUTION INTRAVENOUS ONCE
Refills: 0 | Status: COMPLETED | OUTPATIENT
Start: 2021-03-18 | End: 2021-03-18

## 2021-03-18 RX ORDER — ALPRAZOLAM 0.25 MG
1 TABLET ORAL
Qty: 0 | Refills: 0 | DISCHARGE

## 2021-03-18 RX ORDER — LINEZOLID 600 MG/300ML
INJECTION, SOLUTION INTRAVENOUS
Refills: 0 | Status: DISCONTINUED | OUTPATIENT
Start: 2021-03-18 | End: 2021-03-20

## 2021-03-18 RX ORDER — ALPRAZOLAM 0.25 MG
0.5 TABLET ORAL EVERY 6 HOURS
Refills: 0 | Status: DISCONTINUED | OUTPATIENT
Start: 2021-03-18 | End: 2021-03-20

## 2021-03-18 RX ORDER — ENOXAPARIN SODIUM 100 MG/ML
40 INJECTION SUBCUTANEOUS DAILY
Refills: 0 | Status: DISCONTINUED | OUTPATIENT
Start: 2021-03-18 | End: 2021-03-20

## 2021-03-18 RX ADMIN — MORPHINE SULFATE 4 MILLIGRAM(S): 50 CAPSULE, EXTENDED RELEASE ORAL at 12:39

## 2021-03-18 RX ADMIN — MORPHINE SULFATE 2 MILLIGRAM(S): 50 CAPSULE, EXTENDED RELEASE ORAL at 01:20

## 2021-03-18 RX ADMIN — OXYCODONE AND ACETAMINOPHEN 1 TABLET(S): 5; 325 TABLET ORAL at 05:11

## 2021-03-18 RX ADMIN — MORPHINE SULFATE 4 MILLIGRAM(S): 50 CAPSULE, EXTENDED RELEASE ORAL at 21:00

## 2021-03-18 RX ADMIN — OXYCODONE AND ACETAMINOPHEN 1 TABLET(S): 5; 325 TABLET ORAL at 18:05

## 2021-03-18 RX ADMIN — OXYCODONE AND ACETAMINOPHEN 1 TABLET(S): 5; 325 TABLET ORAL at 06:13

## 2021-03-18 RX ADMIN — LINEZOLID 300 MILLIGRAM(S): 600 INJECTION, SOLUTION INTRAVENOUS at 12:44

## 2021-03-18 RX ADMIN — MORPHINE SULFATE 2 MILLIGRAM(S): 50 CAPSULE, EXTENDED RELEASE ORAL at 08:12

## 2021-03-18 RX ADMIN — OXYCODONE AND ACETAMINOPHEN 1 TABLET(S): 5; 325 TABLET ORAL at 17:03

## 2021-03-18 RX ADMIN — MORPHINE SULFATE 4 MILLIGRAM(S): 50 CAPSULE, EXTENDED RELEASE ORAL at 20:23

## 2021-03-18 RX ADMIN — MORPHINE SULFATE 4 MILLIGRAM(S): 50 CAPSULE, EXTENDED RELEASE ORAL at 13:09

## 2021-03-18 RX ADMIN — MORPHINE SULFATE 2 MILLIGRAM(S): 50 CAPSULE, EXTENDED RELEASE ORAL at 01:50

## 2021-03-18 RX ADMIN — MORPHINE SULFATE 2 MILLIGRAM(S): 50 CAPSULE, EXTENDED RELEASE ORAL at 08:42

## 2021-03-18 RX ADMIN — OXYCODONE AND ACETAMINOPHEN 1 TABLET(S): 5; 325 TABLET ORAL at 10:54

## 2021-03-18 RX ADMIN — Medication 166.67 MILLIGRAM(S): at 09:10

## 2021-03-18 RX ADMIN — Medication 0.5 MILLIGRAM(S): at 23:03

## 2021-03-18 RX ADMIN — OXYCODONE AND ACETAMINOPHEN 1 TABLET(S): 5; 325 TABLET ORAL at 11:24

## 2021-03-18 NOTE — CONSULT NOTE ADULT - ASSESSMENT
41 y/o M pmhx obesity, remote history of pilonidal cyst, multiple abscesses - right thigh, left scrotum, and right axillary. I&D of his scrotal abscess (+MRSA) 1/21; followed by right axillary abscess 1/21.  Admitted with right facial cellulitis, fever after picking at pimples    Recommend:  Toradol 15 mg every 6 hours x 24 hours (max 5 days) - pt had very good effect  Oxycodone 10 mg every 6 hours PRN severe pain (no Rx on discharge, pt had 30 day Percocet dispensed 3/12/21)  Can consider IV Tylenol as well  Warm/cool packs for comfort  Bowel Regimen  Incentive Spirometer  Monitor for sedation, respiratory depression      Time spent on encounter:    30    Minutes    Chronic Pain Service  [phone] 41 y/o M pmhx obesity, remote history of pilonidal cyst, multiple abscesses - right thigh, left scrotum, and right axillary. I&D of his scrotal abscess (+MRSA) 1/21; followed by right axillary abscess 1/21.  Admitted with right facial cellulitis, fever after picking at pimples    Recommend:  Toradol 15 mg every 6 hours x 24 hours (max 5 days) - pt had very good effect  Oxycodone 10 mg every 6 hours PRN severe pain (no Rx on discharge, pt had 30 day Percocet dispensed 3/12/21)  Can consider IV Tylenol as well  Warm/cool packs for comfort  Bowel Regimen  Incentive Spirometer  Monitor for sedation, respiratory depression    Signing off      Time spent on encounter:    30    Minutes    Chronic Pain Service  [phone]

## 2021-03-18 NOTE — PROGRESS NOTE ADULT - SUBJECTIVE AND OBJECTIVE BOX
Date of Service  : 03-18-21     INTERVAL HPI/OVERNIGHT EVENTS: Seen and examined earlier today . I feel better after Abx was changed. Have pain.   Vital Signs Last 24 Hrs  T(C): 36.5 (18 Mar 2021 16:42), Max: 37.2 (17 Mar 2021 20:50)  T(F): 97.7 (18 Mar 2021 16:42), Max: 99 (17 Mar 2021 20:50)  HR: 79 (18 Mar 2021 16:42) (66 - 84)  BP: 126/89 (18 Mar 2021 16:42) (113/69 - 144/72)  BP(mean): --  RR: 18 (18 Mar 2021 16:42) (18 - 18)  SpO2: 96% (18 Mar 2021 16:42) (94% - 99%)  I&O's Summary    17 Mar 2021 07:01  -  18 Mar 2021 07:00  --------------------------------------------------------  IN: 550 mL / OUT: 0 mL / NET: 550 mL    18 Mar 2021 07:01  -  18 Mar 2021 19:20  --------------------------------------------------------  IN: 1050 mL / OUT: 0 mL / NET: 1050 mL      MEDICATIONS  (STANDING):  ALPRAZolam 0.5 milliGRAM(s) Oral every 6 hours  influenza   Vaccine 0.5 milliLiter(s) IntraMuscular once  linezolid  IVPB        MEDICATIONS  (PRN):  morphine  - Injectable 4 milliGRAM(s) IV Push every 6 hours PRN Severe Pain (7 - 10)  oxycodone    5 mG/acetaminophen 325 mG 1 Tablet(s) Oral every 6 hours PRN Moderate Pain (4 - 6)    LABS:                        13.8   9.75  )-----------( 199      ( 18 Mar 2021 07:26 )             40.8     03-18    137  |  100  |  14  ----------------------------<  98  3.9   |  22  |  1.12    Ca    9.2      18 Mar 2021 07:23    TPro  7.6  /  Alb  4.0  /  TBili  0.5  /  DBili  x   /  AST  15  /  ALT  23  /  AlkPhos  68  03-17    PT/INR - ( 17 Mar 2021 12:19 )   PT: 12.8 sec;   INR: 1.07 ratio         PTT - ( 17 Mar 2021 12:19 )  PTT:32.0 sec    CAPILLARY BLOOD GLUCOSE              REVIEW OF SYSTEMS:  CONSTITUTIONAL: No fever, weight loss, or fatigue  EYES: No eye pain, visual disturbances, or discharge  ENMT:  No difficulty hearing, tinnitus, vertigo; No sinus or throat pain  NECK: No pain or stiffness  RESPIRATORY: No cough, wheezing, chills or hemoptysis; No shortness of breath  CARDIOVASCULAR: No chest pain, palpitations, dizziness, or leg swelling  GASTROINTESTINAL: No abdominal or epigastric pain. No nausea, vomiting, or hematemesis; No diarrhea or constipation. No melena or hematochezia.  GENITOURINARY: No dysuria, frequency, hematuria, or incontinence  NEUROLOGICAL: No headaches, memory loss, loss of strength, numbness, or tremors  SKIN: Rt facial redness with swelling     Consultant(s) Notes Reviewed:  [x ] YES  [ ] NO    PHYSICAL EXAM:  GENERAL: NAD,Obese   HEAD:  Atraumatic, Normocephalic  EYES: EOMI, PERRLA, conjunctiva and sclera clear  ENMT: No tonsillar erythema, exudates, or enlargement; Moist mucous membranes, Good dentition, No lesions  NECK: Supple, No JVD, Normal thyroid  NERVOUS SYSTEM:  Alert & Oriented X3, No focal deficit   CHEST/LUNG: Good air entry bilateral with no  rales, rhonchi, wheezing, or rubs  HEART: Regular rate and rhythm; No murmurs, rubs, or gallops  ABDOMEN: Soft, Nontender, Nondistended; Bowel sounds present  EXTREMITIES:  2+ Peripheral Pulses, No clubbing, cyanosis, or edema  SKIN: Rt facial erythema with right labial purulent drainage area     Care Discussed with Consultants/Other Providers [ x] YES  [ ] NO

## 2021-03-18 NOTE — PROGRESS NOTE ADULT - ASSESSMENT
39 y/o M pmhx obesity, remote history of pilonidal cyst, recurrent abscess on scrotum presenting with concern for facial infection worsening for 3 days. Patient underwent I&D of his scrotal abscess which yielded MRSA (Vanco NAI= 1 Susc to clinda, TMP-Sx,Tetra, Linezolid. A few GNRs were present,  treated with Cefepime and LInezolid and discharged on po Cefpodoxime/Linezolid), 1/22/21 developed axillary abscess treated with bactrim, and most recently established care with Dr. Escalona, treated with minocycline, intranasal mupirocin and daily hibiclens showers for colonization of MRSA (instructed to perform for 7 days /mo for 3 months). Patient reports that 3 days ago he noticed swelling and pimple developing on the L side of his nose; he picked at the area and drained small amount of pus from it and started to resolve. Yesterday noticed another pimple developing in the same location next to his nose on the right side, which he also picked at and tried to drain and then this morning woke up with significant swelling and area that is hard and red above this pimple. He reports significant pain to the touch of the area and this morning swelling was significant enough that it was making it difficult for him to open his eye. He reports this is much better now than it was, has not taken any NSAIDs or any medication for pain. Last night had temp of 99.4 and chills, and when noted the area to be worsening this morning so was advised to go to ER by his infectious disease doctor .      Problem/Plan - 1:  ·  Problem: Facial cellulitis with purulent drainage .  Plan: With H/O Recurrent MRSA infection .   ID help appreciated . Started on IV Vancomycin and now changed to  Linezolid .   Blood cultures and Nasal swab results pending.   Pain meds . Normal  HgbA1c .      Problem/Plan - 2:  ·  Problem: Pain  .  Plan: Pain management helping.      Problem/Plan - 3:  ·  Problem: Anxiety .  Plan: Home dose Xanax

## 2021-03-18 NOTE — PROGRESS NOTE ADULT - ASSESSMENT
Imp/rx:    Purulent facial cellulitis.  Little changed and vanco dosing with him will be difficult.  I'll change to linezolid.  Hold dc until we see some improvement.

## 2021-03-18 NOTE — CHART NOTE - NSCHARTNOTEFT_GEN_A_CORE
Patient reported that he was taking Xanax 0.5 mg Q 6 hr x 12 years.  Confirmed on I stop Ref #[id number]. DW Dr Newberry, agreed to resume  at the same dose.   Elise Parrish NP  [phone]

## 2021-03-18 NOTE — CONSULT NOTE ADULT - SUBJECTIVE AND OBJECTIVE BOX
Chief Complaint:  Patient is a 40y old  Male who presents with a chief complaint of Rt facial boil and cellulitis (18 Mar 2021 11:46)    HPI:  39 y/o M pmhx obesity, remote history of pilonidal cyst, recurrent abscess on scrotum presenting with concern for facial infection worsening for 3 days. Patient underwent I&D of his scrotal abscess which yielded MRSA (Vanco NAI= 1 Susc to clinda, TMP-Sx,Tetra, Linezolid. A few GNRs were present,  treated with Cefepime and LInezolid and discharged on po Cefpodoxime/Linezolid), 1/22/21 developed axillary abscess treated with bactrim, and most recently established care with Dr. Escalona, treated with minocycline, intranasal mupirocin and daily hibiclens showers for colonization of MRSA (instructed to perform for 7 days /mo for 3 months). Patient reports that 3 days ago he noticed swelling and pimple developing on the L side of his nose; he picked at the area and drained small amount of pus from it and started to resolve. Yesterday noticed another pimple developing in the same location next to his nose on the right side, which he also picked at and tried to drain and then this morning woke up with significant swelling and area that is hard and red above this pimple. He reports significant pain to the touch of the area and this morning swelling was significant enough that it was making it difficult for him to open his eye. He reports this is much better now than it was, has not taken any NSAIDs or any medication for pain. Last night had temp of 99.4 and chills, and when noted the area to be worsening this morning so was advised to go to ER by his infectious disease doctor .  (17 Mar 2021 16:40)    Current out- patient pain regimen: None - but has been on Percocet  mg for other abscesses/I&Ds    Out Patient Pain Management provider: None    [facility] Prescription Monitoring Program: Reference #[id number]    Opioid Risk Tool (ORT-OUD) Score: Low    Pain Score: 9/10    Pt seen lying in bed, on phone and watching TV, appears comfortable. C/O right facial and head pain that's described as a heavy "throbbing". Percocet 5 mg has no effect, Toradol had very good effect yesterday and the Morphine IV helps. No other issues.    REVIEW OF SYSTEMS:  CONSTITUTIONAL: weight loss, falls  NEURO: No memory loss, loss of strength, tremors, dizziness or blurred vision  RESP: No shortness of breath, cough  CV: No chest pain, palpitations  GI: No abdominal pain, nausea, vomiting, diarrhea, constipation, incontinence, (+)flatus  : No Bowel/bladder incontinence/retention  MSK: No joint pain or swelling; No muscle, back, or extremity pain, no upper or lower motor strength weakness, no saddle anesthesia   SKIN: No itching, burning, rashes, or lesions except for facial cellulitis  PSYCHIATRIC: No depression, (+)difficulty sleeping 2/2 discomfort, endorses anxiety      PHYSICAL EXAM  GENERAL: Seen at bedside, NAD, well-groomed, well-developed, appears stated age, no signs of toxicity  NEURO: Alert & Oriented X3, Good concentration; Follows commands  HEENT: Head atraumatic, normocephalic; speech clear and fluent; right facial edema and erythema proximal to nose worst  GI: Appetite good, (+)BM  : Voiding without issue  EXTREMITIES: moves all extremities equally, ambulates independently  PSYCH: affect normal; good eye contact; no signs of depression or anxiety      PAST MEDICAL & SURGICAL HISTORY:  MRSA infection        FAMILY HISTORY:      Allergies    No Known Allergies        MEDICATIONS  (STANDING):  ALPRAZolam 0.5 milliGRAM(s) Oral every 6 hours  influenza   Vaccine 0.5 milliLiter(s) IntraMuscular once  linezolid  IVPB        MEDICATIONS  (PRN):  morphine  - Injectable 4 milliGRAM(s) IV Push every 6 hours PRN Severe Pain (7 - 10)  oxycodone    5 mG/acetaminophen 325 mG 1 Tablet(s) Oral every 6 hours PRN Moderate Pain (4 - 6)      Vital Signs:  T(C): 36.5 (03-18-21 @ 16:42)  HR: 79 (03-18-21 @ 16:42)  BP: 126/89 (03-18-21 @ 16:42)  RR: 18 (03-18-21 @ 16:42)  SpO2: 96% (03-18-21 @ 16:42)    Pertinent labs/radiology:  Reviewed                          13.8   9.75  )-----------( 199      ( 18 Mar 2021 07:26 )             40.8       03-18    137  |  100  |  14  ----------------------------<  98  3.9   |  22  |  1.12    Ca    9.2      18 Mar 2021 07:23    TPro  7.6  /  Alb  4.0  /  TBili  0.5  /  DBili  x   /  AST  15  /  ALT  23  /  AlkPhos  68  03-17    < from: CT Maxillofacial w/ IV Cont (03.17.21 @ 15:21) >  IMPRESSION:    There is a mild anterior intraorbital soft tissue thickening with mild adjacent inflammatory stranding extending down to the level of the mandible. No abscess. No adjacent bone erosion.    < end of copied text >

## 2021-03-18 NOTE — PROGRESS NOTE ADULT - SUBJECTIVE AND OBJECTIVE BOX
INFECTIOUS DISEASES FOLLOW UP--Yon Murray MD  Pager [phone]    This is a follow up note for this  40y Male with  Cellulitis of face--prior MRSA.  Still with pain, induration and little change.    Further ROS:  CONSTITUTIONAL:  No fever, good appetite  CARDIOVASCULAR:  No chest pain or palpitations  RESPIRATORY:  No dyspnea  GASTROINTESTINAL:  No nausea, vomiting, diarrhea, or abdominal pain  GENITOURINARY:  No dysuria  NEUROLOGIC:  No headache,     Allergies  No Known Allergies    ANTIBIOTICS/RELEVANT:  antimicrobials  vancomycin  IVPB      vancomycin  IVPB 1250 milliGRAM(s) IV Intermittent every 8 hours    immunologic:  influenza   Vaccine 0.5 milliLiter(s) IntraMuscular once    OTHER:  morphine  - Injectable 2 milliGRAM(s) IV Push every 6 hours PRN  oxycodone    5 mG/acetaminophen 325 mG 1 Tablet(s) Oral every 6 hours PRN    Objective:  Vital Signs Last 24 Hrs  T(C): 36.4 (18 Mar 2021 10:00), Max: 37.2 (17 Mar 2021 20:50)  T(F): 97.5 (18 Mar 2021 10:00), Max: 99 (17 Mar 2021 20:50)  HR: 66 (18 Mar 2021 10:00) (66 - 84)  BP: 144/72 (18 Mar 2021 10:00) (113/69 - 144/72)  BP(mean): 80 (17 Mar 2021 16:40) (80 - 80)  RR: 18 (18 Mar 2021 10:00) (16 - 18)  SpO2: 94% (18 Mar 2021 10:00) (94% - 99%)    PHYSICAL EXAM:  Constitutional:no acute distress  Eyes:FRAN, EOMI  R > L purulent cellulitis no obvious ocular changes	  Respiratory: clear BL  Cardiovascular: S1S2  Gastrointestinal:soft, (+) BS, no tenderness  Extremities:no e/e/c  No Lymphadenopathy  IV sites not inflammed.    LABS:                        13.8   9.75  )-----------( 199      ( 18 Mar 2021 07:26 )             40.8     03-18    137  |  100  |  14  ----------------------------<  98  3.9   |  22  |  1.12    Ca    9.2      18 Mar 2021 07:23    TPro  7.6  /  Alb  4.0  /  TBili  0.5  /  DBili  x   /  AST  15  /  ALT  23  /  AlkPhos  68  03-17    PT/INR - ( 17 Mar 2021 12:19 )   PT: 12.8 sec;   INR: 1.07 ratio    PTT - ( 17 Mar 2021 12:19 )  PTT:32.0 sec    MICROBIOLOGY: MRSA/MSSA swab pcr +    RADIOLOGY & ADDITIONAL STUDIES:    < from: CT Maxillofacial w/ IV Cont (03.17.21 @ 15:21) >    There is a mild anterior intraorbital soft tissue thickening with mild adjacent inflammatory stranding extending down to the level of the mandible. No abscess. No adjacent bone erosion.      < end of copied text >

## 2021-03-19 VITALS
SYSTOLIC BLOOD PRESSURE: 119 MMHG | DIASTOLIC BLOOD PRESSURE: 71 MMHG | HEART RATE: 74 BPM | OXYGEN SATURATION: 96 % | TEMPERATURE: 98 F | RESPIRATION RATE: 18 BRPM

## 2021-03-19 PROCEDURE — 10060 I&D ABSCESS SIMPLE/SINGLE: CPT

## 2021-03-19 PROCEDURE — 99232 SBSQ HOSP IP/OBS MODERATE 35: CPT

## 2021-03-19 PROCEDURE — 99222 1ST HOSP IP/OBS MODERATE 55: CPT | Mod: 25

## 2021-03-19 RX ORDER — MUPIROCIN 20 MG/G
1 OINTMENT TOPICAL
Refills: 0 | Status: DISCONTINUED | OUTPATIENT
Start: 2021-03-19 | End: 2021-03-20

## 2021-03-19 RX ORDER — OXYCODONE HYDROCHLORIDE 5 MG/1
10 TABLET ORAL EVERY 6 HOURS
Refills: 0 | Status: DISCONTINUED | OUTPATIENT
Start: 2021-03-19 | End: 2021-03-20

## 2021-03-19 RX ORDER — KETOROLAC TROMETHAMINE 30 MG/ML
15 SYRINGE (ML) INJECTION EVERY 6 HOURS
Refills: 0 | Status: DISCONTINUED | OUTPATIENT
Start: 2021-03-19 | End: 2021-03-20

## 2021-03-19 RX ADMIN — LINEZOLID 300 MILLIGRAM(S): 600 INJECTION, SOLUTION INTRAVENOUS at 17:35

## 2021-03-19 RX ADMIN — OXYCODONE HYDROCHLORIDE 10 MILLIGRAM(S): 5 TABLET ORAL at 22:48

## 2021-03-19 RX ADMIN — Medication 0.5 MILLIGRAM(S): at 17:35

## 2021-03-19 RX ADMIN — OXYCODONE HYDROCHLORIDE 10 MILLIGRAM(S): 5 TABLET ORAL at 15:36

## 2021-03-19 RX ADMIN — ENOXAPARIN SODIUM 40 MILLIGRAM(S): 100 INJECTION SUBCUTANEOUS at 11:53

## 2021-03-19 RX ADMIN — Medication 15 MILLIGRAM(S): at 17:35

## 2021-03-19 RX ADMIN — Medication 15 MILLIGRAM(S): at 23:58

## 2021-03-19 RX ADMIN — Medication 0.5 MILLIGRAM(S): at 05:06

## 2021-03-19 RX ADMIN — Medication 0.5 MILLIGRAM(S): at 11:53

## 2021-03-19 RX ADMIN — MUPIROCIN 1 APPLICATION(S): 20 OINTMENT TOPICAL at 21:45

## 2021-03-19 RX ADMIN — LINEZOLID 300 MILLIGRAM(S): 600 INJECTION, SOLUTION INTRAVENOUS at 05:06

## 2021-03-19 RX ADMIN — Medication 0.5 MILLIGRAM(S): at 23:59

## 2021-03-19 RX ADMIN — OXYCODONE HYDROCHLORIDE 10 MILLIGRAM(S): 5 TABLET ORAL at 16:10

## 2021-03-19 RX ADMIN — OXYCODONE HYDROCHLORIDE 10 MILLIGRAM(S): 5 TABLET ORAL at 21:44

## 2021-03-19 RX ADMIN — Medication 15 MILLIGRAM(S): at 11:53

## 2021-03-19 RX ADMIN — MORPHINE SULFATE 4 MILLIGRAM(S): 50 CAPSULE, EXTENDED RELEASE ORAL at 08:15

## 2021-03-19 RX ADMIN — MORPHINE SULFATE 4 MILLIGRAM(S): 50 CAPSULE, EXTENDED RELEASE ORAL at 09:13

## 2021-03-19 NOTE — CONSULT NOTE ADULT - PROBLEM SELECTOR RECOMMENDATION 9
- mupirocin to I&D sites and to nares b/l   - abx per ID  no further ent intervention at this time  Patient should follow up in ENT office as an outpatient. May see Dr. Keshav Gilliam or Jose. Call [phone].

## 2021-03-19 NOTE — PROGRESS NOTE ADULT - ASSESSMENT
41 y/o M pmhx obesity, remote history of pilonidal cyst, recurrent abscess on scrotum presenting with concern for facial infection worsening for 3 days. Patient underwent I&D of his scrotal abscess which yielded MRSA (Vanco NAI= 1 Susc to clinda, TMP-Sx,Tetra, Linezolid. A few GNRs were present,  treated with Cefepime and LInezolid and discharged on po Cefpodoxime/Linezolid), 1/22/21 developed axillary abscess treated with bactrim, and most recently established care with Dr. Escalona, treated with minocycline, intranasal mupirocin and daily hibiclens showers for colonization of MRSA (instructed to perform for 7 days /mo for 3 months). Patient reports that 3 days ago he noticed swelling and pimple developing on the L side of his nose; he picked at the area and drained small amount of pus from it and started to resolve. Yesterday noticed another pimple developing in the same location next to his nose on the right side, which he also picked at and tried to drain and then this morning woke up with significant swelling and area that is hard and red above this pimple. He reports significant pain to the touch of the area and this morning swelling was significant enough that it was making it difficult for him to open his eye. He reports this is much better now than it was, has not taken any NSAIDs or any medication for pain. Last night had temp of 99.4 and chills, and when noted the area to be worsening this morning so was advised to go to ER by his infectious disease doctor .      Problem/Plan - 1:  ·  Problem: Facial cellulitis with purulent drainage .  Plan: S/P I&D . ON Linezolid now .   With H/O Recurrent MRSA infection .   ID help appreciated .   Started on IV Vancomycin and now changed to  Linezolid .   Blood cultures and Nasal swab results noted.   Pain meds .   Normal  HgbA1c .      Problem/Plan - 2:  ·  Problem: Pain  .  Plan: Pain management helping.      Problem/Plan - 3:  ·  Problem: Anxiety .  Plan: Home dose Xanax

## 2021-03-19 NOTE — CONSULT NOTE ADULT - ASSESSMENT
1. The severity of signs/symptoms.(See ED/admit documents) 40y with R nasal/facial cellulitis + MRSA on Cx, ENT called for eval. pending to be seen  40y with R nasal/facial cellulitis + MRSA on Cx, On exam + erythema and induration no fluctuance.  bedside I&D performed with18 gauge used, scant purulence expressed. L furuncle unroofed with 18 gauge needle. Pt with noticeable improvement on IV abx

## 2021-03-19 NOTE — CONSULT NOTE ADULT - SUBJECTIVE AND OBJECTIVE BOX
CC: right nare pimple +MRSA, cellulitis     HPI:  39 y/o M pmhx obesity, remote history of pilonidal cyst, recurrent abscess on scrotum presenting with concern for facial infection worsening for 3 days. Patient underwent I&D of his scrotal abscess which yielded MRSA (Vanco NAI= 1 Susc to clinda, TMP-Sx,Tetra, Linezolid. A few GNRs were present,  treated with Cefepime and LInezolid and discharged on po Cefpodoxime/Linezolid), 1/22/21 developed axillary abscess treated with bactrim, and most recently established care with Dr. Escalona, treated with minocycline, intranasal mupirocin and daily hibiclens showers for colonization of MRSA (instructed to perform for 7 days /mo for 3 months). Patient reports that 3 days ago he noticed swelling and pimple developing on the right side, which he picked at and tried to drain and then this morning woke up with significant swelling and area that is hard and red above this pimple. He reports significant pain to the touch of the area and this morning swelling was significant enough that it was making it difficult for him to open his eye. He reports this is much better now than it was, has not taken any NSAIDs or any medication for pain. Last night had temp of 99.4 and chills, and when noted the area to be worsening this morning so was advised to go to ER by his infectious disease doctor.     PAST MEDICAL & SURGICAL HISTORY:  MRSA infection      Allergies    No Known Allergies    Intolerances      MEDICATIONS  (STANDING):  ALPRAZolam 0.5 milliGRAM(s) Oral every 6 hours  enoxaparin Injectable 40 milliGRAM(s) SubCutaneous daily  influenza   Vaccine 0.5 milliLiter(s) IntraMuscular once  ketorolac   Injectable 15 milliGRAM(s) IV Push every 6 hours  linezolid  IVPB      linezolid  IVPB 600 milliGRAM(s) IV Intermittent every 12 hours    MEDICATIONS  (PRN):  oxyCODONE    IR 10 milliGRAM(s) Oral every 6 hours PRN Severe Pain (7 - 10)      Social History: no tobacco, no etoh     Family history: Pt denies any sign FHx    ROS:   ENT: all negative except as noted in HPI   CV: denies palpitations  Pulm: denies SOB, cough, hemoptysis  GI: denies change in apetite, indigestion, n/v  : denies pertinent urinary symptoms, urgency  Neuro: denies numbness/tingling, loss of sensation  Psych: denies anxiety  MS: denies muscle weakness, instability  Heme: denies easy bruising or bleeding  Endo: denies heat/cold intolerance, excessive sweating  Vascular: denies LE edema    Vital Signs Last 24 Hrs  T(C): 36.4 (19 Mar 2021 08:00), Max: 36.9 (18 Mar 2021 23:16)  T(F): 97.6 (19 Mar 2021 08:00), Max: 98.4 (18 Mar 2021 23:16)  HR: 68 (19 Mar 2021 08:00) (68 - 79)  BP: 108/70 (19 Mar 2021 08:00) (108/70 - 135/74)  BP(mean): --  RR: 18 (19 Mar 2021 08:00) (18 - 18)  SpO2: 96% (19 Mar 2021 08:00) (95% - 96%)                          13.8   9.75  )-----------( 199      ( 18 Mar 2021 07:26 )             40.8    03-18    137  |  100  |  14  ----------------------------<  98  3.9   |  22  |  1.12    Ca    9.2      18 Mar 2021 07:23         PHYSICAL EXAM:  Gen: NAD  Skin: No rashes, bruises, or lesions  Head: Normocephalic, Atraumatic  Face: + right nasal pimple with associated edema, erythema and tenderness, no fluctuance. Parotid glands soft without mass  Eyes: no scleral injection  Nose: Nares bilaterally patent, no discharge  Mouth: No Stridor / Drooling / Trismus.  Mucosa moist, tongue/uvula midline, oropharynx clear  Neck: Flat, supple, no lymphadenopathy, trachea midline, no masses  Lymphatic: No lymphadenopathy  Resp: breathing easily, no stridor  CV: no peripheral edema/cyanosis  GI: nondistended   Peripheral vascular: no JVD or edema  Neuro: facial nerve intact, no facial droop       CC: right nare pimple +MRSA, cellulitis     HPI:  39 y/o M pmhx obesity, remote history of pilonidal cyst, recurrent abscess on scrotum presenting with concern for facial infection worsening for 3 days. Patient underwent I&D of his scrotal abscess which yielded MRSA (Vanco NAI= 1 Susc to clinda, TMP-Sx,Tetra, Linezolid. A few GNRs were present,  treated with Cefepime and LInezolid and discharged on po Cefpodoxime/Linezolid), 1/22/21 developed axillary abscess treated with bactrim, and most recently established care with Dr. Escalona, treated with minocycline, intranasal mupirocin and daily hibiclens showers for colonization of MRSA (instructed to perform for 7 days /mo for 3 months). Patient reports that 3 days ago he noticed swelling and pimple developing on the right side, which he picked at and tried to drain and then this morning woke up with significant swelling and area that is hard and red above this pimple. He reports significant pain to the touch of the area and this morning swelling was significant enough that it was making it difficult for him to open his eye. He reports this is much better now than it was especially after med change from vanco to linezolid. swelling has much improved since admission. ENT called for eval of I&D.       PAST MEDICAL & SURGICAL HISTORY:  MRSA infection      Allergies    No Known Allergies    Intolerances      MEDICATIONS  (STANDING):  ALPRAZolam 0.5 milliGRAM(s) Oral every 6 hours  enoxaparin Injectable 40 milliGRAM(s) SubCutaneous daily  influenza   Vaccine 0.5 milliLiter(s) IntraMuscular once  ketorolac   Injectable 15 milliGRAM(s) IV Push every 6 hours  linezolid  IVPB      linezolid  IVPB 600 milliGRAM(s) IV Intermittent every 12 hours    MEDICATIONS  (PRN):  oxyCODONE    IR 10 milliGRAM(s) Oral every 6 hours PRN Severe Pain (7 - 10)      Social History: no tobacco, no etoh     Family history: Pt denies any sign FHx    ROS:   ENT: all negative except as noted in HPI   CV: denies palpitations  Pulm: denies SOB, cough, hemoptysis  GI: denies change in apetite, indigestion, n/v  : denies pertinent urinary symptoms, urgency  Neuro: denies numbness/tingling, loss of sensation  Psych: denies anxiety  MS: denies muscle weakness, instability  Heme: denies easy bruising or bleeding  Endo: denies heat/cold intolerance, excessive sweating  Vascular: denies LE edema    Vital Signs Last 24 Hrs  T(C): 36.4 (19 Mar 2021 08:00), Max: 36.9 (18 Mar 2021 23:16)  T(F): 97.6 (19 Mar 2021 08:00), Max: 98.4 (18 Mar 2021 23:16)  HR: 68 (19 Mar 2021 08:00) (68 - 79)  BP: 108/70 (19 Mar 2021 08:00) (108/70 - 135/74)  BP(mean): --  RR: 18 (19 Mar 2021 08:00) (18 - 18)  SpO2: 96% (19 Mar 2021 08:00) (95% - 96%)                          13.8   9.75  )-----------( 199      ( 18 Mar 2021 07:26 )             40.8    03-18    137  |  100  |  14  ----------------------------<  98  3.9   |  22  |  1.12    Ca    9.2      18 Mar 2021 07:23         PHYSICAL EXAM:  Gen: NAD  Skin: No rashes, bruises, or lesions  Head: Normocephalic, Atraumatic  Face: + right nasal pimple with associated edema, erythema and tenderness, no fluctuance. Parotid glands soft without mass  Eyes: no scleral injection  Nose: Nares bilaterally patent, no discharge  Mouth: No Stridor / Drooling / Trismus.  Mucosa moist, tongue/uvula midline, oropharynx clear  Neck: Flat, supple, no lymphadenopathy, trachea midline, no masses  Lymphatic: No lymphadenopathy  Resp: breathing easily, no stridor  CV: no peripheral edema/cyanosis  GI: nondistended   Peripheral vascular: no JVD or edema  Neuro: facial nerve intact, no facial droop      MRSA/MSSA PCR (03.17.21 @ 18:56)    MRSA PCR Result.: Detected: MRSA/MSSA PCR assay is a qualitative in vitro diagnostic test for the  direct detection and differentiation of methicillin-resistant  Staphylococcus aureus (MRSA) from Staphylococcus aureus (SA). The assay  detects DNA from nasal swabs in patients atrisk for nasal colonization.  It is not intended to diagnose MRSA or SA infections nor guide or monitor  treatment for MRSA/SA infections. A negative result does not preclude  nasal colonization. The assay is FDA-approved and its performance has  been established by [employer], [place] and the [facility], [place], [place].    Staph Aureus PCR Result: Detected     CC: right nare pimple +MRSA, cellulitis     HPI:  41 y/o M pmhx obesity, remote history of pilonidal cyst, recurrent abscess on scrotum presenting with concern for facial infection worsening for 3 days. Patient underwent I&D of his scrotal abscess which yielded MRSA (Vanco NAI= 1 Susc to clinda, TMP-Sx,Tetra, Linezolid. A few GNRs were present,  treated with Cefepime and LInezolid and discharged on po Cefpodoxime/Linezolid), 1/22/21 developed axillary abscess treated with bactrim, and most recently established care with Dr. Escalona, treated with minocycline, intranasal mupirocin and daily hibiclens showers for colonization of MRSA (instructed to perform for 7 days /mo for 3 months). Patient reports that 3 days ago he noticed swelling and pimple developing on the right side, which he picked at and tried to drain and then this morning woke up with significant swelling and area that is hard and red above this pimple. He reports significant pain to the touch of the area and this morning swelling was significant enough that it was making it difficult for him to open his eye. He reports this is much better now than it was especially after med change from vanco to linezolid. swelling has much improved since admission. ENT called for eval of I&D.       PAST MEDICAL & SURGICAL HISTORY:  MRSA infection      Allergies    No Known Allergies    Intolerances      MEDICATIONS  (STANDING):  ALPRAZolam 0.5 milliGRAM(s) Oral every 6 hours  enoxaparin Injectable 40 milliGRAM(s) SubCutaneous daily  influenza   Vaccine 0.5 milliLiter(s) IntraMuscular once  ketorolac   Injectable 15 milliGRAM(s) IV Push every 6 hours  linezolid  IVPB      linezolid  IVPB 600 milliGRAM(s) IV Intermittent every 12 hours    MEDICATIONS  (PRN):  oxyCODONE    IR 10 milliGRAM(s) Oral every 6 hours PRN Severe Pain (7 - 10)      Social History: no tobacco, no etoh     Family history: Pt denies any sign FHx    ROS:   ENT: all negative except as noted in HPI   CV: denies palpitations  Pulm: denies SOB, cough, hemoptysis  GI: denies change in apetite, indigestion, n/v  : denies pertinent urinary symptoms, urgency  Neuro: denies numbness/tingling, loss of sensation  Psych: denies anxiety  MS: denies muscle weakness, instability  Heme: denies easy bruising or bleeding  Endo: denies heat/cold intolerance, excessive sweating  Vascular: denies LE edema    Vital Signs Last 24 Hrs  T(C): 36.4 (19 Mar 2021 08:00), Max: 36.9 (18 Mar 2021 23:16)  T(F): 97.6 (19 Mar 2021 08:00), Max: 98.4 (18 Mar 2021 23:16)  HR: 68 (19 Mar 2021 08:00) (68 - 79)  BP: 108/70 (19 Mar 2021 08:00) (108/70 - 135/74)  BP(mean): --  RR: 18 (19 Mar 2021 08:00) (18 - 18)  SpO2: 96% (19 Mar 2021 08:00) (95% - 96%)                          13.8   9.75  )-----------( 199      ( 18 Mar 2021 07:26 )             40.8    03-18    137  |  100  |  14  ----------------------------<  98  3.9   |  22  |  1.12    Ca    9.2      18 Mar 2021 07:23         PHYSICAL EXAM:  Gen: NAD  Skin: No rashes, bruises, or lesions  Head: Normocephalic, Atraumatic  Face: + right nasal pimple with associated edema, erythema and tenderness, no fluctuance. 18 gauge used to perform I&D, scant purulence expressed. L furuncle unroofed with 18 gauge needle.  Parotid glands soft without mass  Eyes: no scleral injection  Nose: Nares bilaterally patent, no discharge  Mouth: No Stridor / Drooling / Trismus.  Mucosa moist, tongue/uvula midline, oropharynx clear  Neck: Flat, supple, no lymphadenopathy, trachea midline, no masses  Lymphatic: No lymphadenopathy  Resp: breathing easily, no stridor  CV: no peripheral edema/cyanosis  GI: nondistended   Peripheral vascular: no JVD or edema  Neuro: facial nerve intact, no facial droop      MRSA/MSSA PCR (03.17.21 @ 18:56)    MRSA PCR Result.: Detected: MRSA/MSSA PCR assay is a qualitative in vitro diagnostic test for the  direct detection and differentiation of methicillin-resistant  Staphylococcus aureus (MRSA) from Staphylococcus aureus (SA). The assay  detects DNA from nasal swabs in patients atrisk for nasal colonization.  It is not intended to diagnose MRSA or SA infections nor guide or monitor  treatment for MRSA/SA infections. A negative result does not preclude  nasal colonization. The assay is FDA-approved and its performance has  been established by [employer] [place] [place] and the [facility], [place], [place].    Staph Aureus PCR Result: Detected     CC: right nare pimple +MRSA, cellulitis     HPI:  39 y/o M pmhx obesity, remote history of pilonidal cyst, recurrent abscess on scrotum presenting with concern for facial infection worsening for 3 days. Patient underwent I&D of his scrotal abscess which yielded MRSA (Vanco NAI= 1 Susc to clinda, TMP-Sx,Tetra, Linezolid. A few GNRs were present,  treated with Cefepime and LInezolid and discharged on po Cefpodoxime/Linezolid), 1/22/21 developed axillary abscess treated with bactrim, and most recently established care with Dr. Escalona, treated with minocycline, intranasal mupirocin and daily hibiclens showers for colonization of MRSA (instructed to perform for 7 days /mo for 3 months). Patient reports that 3 days ago he noticed swelling and pimple developing on the right side, which he picked at and tried to drain and then this morning woke up with significant swelling and area that is hard and red above this pimple. He reports significant pain to the touch of the area and this morning swelling was significant enough that it was making it difficult for him to open his eye. He reports this is much better now than it was especially after med change from vanco to linezolid. swelling has much improved since admission. ENT called for eval of I&D.       PAST MEDICAL & SURGICAL HISTORY:  MRSA infection      Allergies    No Known Allergies    Intolerances      MEDICATIONS  (STANDING):  ALPRAZolam 0.5 milliGRAM(s) Oral every 6 hours  enoxaparin Injectable 40 milliGRAM(s) SubCutaneous daily  influenza   Vaccine 0.5 milliLiter(s) IntraMuscular once  ketorolac   Injectable 15 milliGRAM(s) IV Push every 6 hours  linezolid  IVPB      linezolid  IVPB 600 milliGRAM(s) IV Intermittent every 12 hours    MEDICATIONS  (PRN):  oxyCODONE    IR 10 milliGRAM(s) Oral every 6 hours PRN Severe Pain (7 - 10)      Social History: no tobacco, no etoh     Family history: Pt denies any sign FHx    ROS:   ENT: all negative except as noted in HPI   CV: denies palpitations  Pulm: denies SOB, cough, hemoptysis  GI: denies change in apetite, indigestion, n/v  : denies pertinent urinary symptoms, urgency  Neuro: denies numbness/tingling, loss of sensation  Psych: denies anxiety  MS: denies muscle weakness, instability  Heme: denies easy bruising or bleeding  Endo: denies heat/cold intolerance, excessive sweating  Vascular: denies LE edema    Vital Signs Last 24 Hrs  T(C): 36.4 (19 Mar 2021 08:00), Max: 36.9 (18 Mar 2021 23:16)  T(F): 97.6 (19 Mar 2021 08:00), Max: 98.4 (18 Mar 2021 23:16)  HR: 68 (19 Mar 2021 08:00) (68 - 79)  BP: 108/70 (19 Mar 2021 08:00) (108/70 - 135/74)  BP(mean): --  RR: 18 (19 Mar 2021 08:00) (18 - 18)  SpO2: 96% (19 Mar 2021 08:00) (95% - 96%)                          13.8   9.75  )-----------( 199      ( 18 Mar 2021 07:26 )             40.8    03-18    137  |  100  |  14  ----------------------------<  98  3.9   |  22  |  1.12    Ca    9.2      18 Mar 2021 07:23         PHYSICAL EXAM:  Gen: NAD  Skin: No rashes, bruises, or lesions  Head: Normocephalic, Atraumatic  Face: + right nasal pimple with associated edema, erythema and tenderness, no fluctuance. 18 gauge used to perform I&D after 1% lidocaine with 1:100,000 injection, scant purulence expressed. L furuncle unroofed with 18 gauge needle.  Parotid glands soft without mass  Eyes: no scleral injection  Nose: Nares bilaterally patent, no discharge  Mouth: No Stridor / Drooling / Trismus.  Mucosa moist, tongue/uvula midline, oropharynx clear  Neck: Flat, supple, no lymphadenopathy, trachea midline, no masses  Lymphatic: No lymphadenopathy  Resp: breathing easily, no stridor  CV: no peripheral edema/cyanosis  GI: nondistended   Peripheral vascular: no JVD or edema  Neuro: facial nerve intact, no facial droop      MRSA/MSSA PCR (03.17.21 @ 18:56)    MRSA PCR Result.: Detected: MRSA/MSSA PCR assay is a qualitative in vitro diagnostic test for the  direct detection and differentiation of methicillin-resistant  Staphylococcus aureus (MRSA) from Staphylococcus aureus (SA). The assay  detects DNA from nasal swabs in patients atrisk for nasal colonization.  It is not intended to diagnose MRSA or SA infections nor guide or monitor  treatment for MRSA/SA infections. A negative result does not preclude  nasal colonization. The assay is FDA-approved and its performance has  been established by [employer], [place] and the [facility], [place], [place].    Staph Aureus PCR Result: Detected

## 2021-03-19 NOTE — CONSULT NOTE ADULT - ATTENDING COMMENTS
The patient has had recurrent cutaneous infections.  Now with infection/abscesses emerging around the nares and extending toward R eye.  Recommend remaining on IV abx until we see some stabilization/improvement.  Upon dc we'll re-engage w strategy to reduce colonization.    For now vancomycin 1250 mg q8h.
I personally saw and examined the patient in detail. I spoke to the PA regarding the assessment and plan of care.  I performed the procedures and I reviewed the above assessment and plan of care, and agree. I have made changes in changes in the body of the note where appropriate.     very minimal purulence expressed - likely almost all cellulitis.  Apply mupirocin to i&d sites directly for at least 3 days.  Application to b/l nares for 2 weeks or longer if desired by ID to eradicate colonization.

## 2021-03-19 NOTE — PROGRESS NOTE ADULT - SUBJECTIVE AND OBJECTIVE BOX
Date of Service  : 03-19-21 @ 19:16    INTERVAL HPI/OVERNIGHT EVENTS: No new concerns . I feel better.   Vital Signs Last 24 Hrs  T(C): 36.6 (19 Mar 2021 16:10), Max: 36.9 (18 Mar 2021 23:16)  T(F): 97.8 (19 Mar 2021 16:10), Max: 98.4 (18 Mar 2021 23:16)  HR: 73 (19 Mar 2021 16:10) (68 - 73)  BP: 123/72 (19 Mar 2021 16:10) (108/70 - 135/74)  BP(mean): --  RR: 18 (19 Mar 2021 16:10) (18 - 18)  SpO2: 99% (19 Mar 2021 16:10) (95% - 99%)  I&O's Summary    18 Mar 2021 07:01  -  19 Mar 2021 07:00  --------------------------------------------------------  IN: 1350 mL / OUT: 0 mL / NET: 1350 mL      MEDICATIONS  (STANDING):  ALPRAZolam 0.5 milliGRAM(s) Oral every 6 hours  enoxaparin Injectable 40 milliGRAM(s) SubCutaneous daily  influenza   Vaccine 0.5 milliLiter(s) IntraMuscular once  ketorolac   Injectable 15 milliGRAM(s) IV Push every 6 hours  linezolid  IVPB      linezolid  IVPB 600 milliGRAM(s) IV Intermittent every 12 hours  mupirocin 2% Nasal 1 Application(s) Nasal two times a day  mupirocin 2% Ointment 1 Application(s) Topical two times a day    MEDICATIONS  (PRN):  oxyCODONE    IR 10 milliGRAM(s) Oral every 6 hours PRN Severe Pain (7 - 10)    LABS:                        13.8   9.75  )-----------( 199      ( 18 Mar 2021 07:26 )             40.8     03-18    137  |  100  |  14  ----------------------------<  98  3.9   |  22  |  1.12    Ca    9.2      18 Mar 2021 07:23          CAPILLARY BLOOD GLUCOSE              REVIEW OF SYSTEMS:  CONSTITUTIONAL: No fever, weight loss, or fatigue  EYES: No eye pain, visual disturbances, or discharge  ENMT:  No difficulty hearing, tinnitus, vertigo; No sinus or throat pain  NECK: No pain or stiffness  RESPIRATORY: No cough, wheezing, chills or hemoptysis; No shortness of breath  CARDIOVASCULAR: No chest pain, palpitations, dizziness, or leg swelling  GASTROINTESTINAL: No abdominal or epigastric pain. No nausea, vomiting, or hematemesis; No diarrhea or constipation. No melena or hematochezia.  GENITOURINARY: No dysuria, frequency, hematuria, or incontinence  NEUROLOGICAL: No headaches, memory loss, loss of strength, numbness, or tremors      RADIOLOGY & ADDITIONAL TESTS:    Consultant(s) Notes Reviewed:  [x ] YES  [ ] NO    PHYSICAL EXAM:  GENERAL: NAD, well-groomed, well-developed ,not in any distress ,  HEAD:  Atraumatic, Normocephalic  EYES: EOMI, PERRLA, conjunctiva and sclera clear  ENMT: No tonsillar erythema, exudates, or enlargement; Moist mucous membranes, Good dentition, No lesions  NECK: Supple, No JVD, Normal thyroid  NERVOUS SYSTEM:  Alert & Oriented X3, No focal deficit   CHEST/LUNG: Good air entry bilateral with no  rales, rhonchi, wheezing, or rubs  HEART: Regular rate and rhythm; No murmurs, rubs, or gallops  ABDOMEN: Soft, Nontender, Nondistended; Bowel sounds present  EXTREMITIES:  2+ Peripheral Pulses, No clubbing, cyanosis, or edema    Care Discussed with Consultants/Other Providers [ x] YES  [ ] NO

## 2021-03-19 NOTE — PROGRESS NOTE ADULT - SUBJECTIVE AND OBJECTIVE BOX
INFECTIOUS DISEASES FOLLOW UP--Yon Murray MD  Pager [phone]    This is a follow up note for this  40y Male with  Cellulitis of face  more indurated R---firm, min drainage.  + sig pain.    Further ROS:  RESPIRATORY:  No dyspnea  GASTROINTESTINAL:  No nausea, vomiting, diarrhea, or abdominal pain    Allergies  No Known Allergies    ANTIBIOTICS/RELEVANT:  antimicrobials  linezolid  IVPB      linezolid  IVPB 600 milliGRAM(s) IV Intermittent every 12 hours    immunologic:  influenza   Vaccine 0.5 milliLiter(s) IntraMuscular once    OTHER:  ALPRAZolam 0.5 milliGRAM(s) Oral every 6 hours  enoxaparin Injectable 40 milliGRAM(s) SubCutaneous daily  ketorolac   Injectable 15 milliGRAM(s) IV Push every 6 hours  oxyCODONE    IR 10 milliGRAM(s) Oral every 6 hours PRN    Objective:  Vital Signs Last 24 Hrs  T(C): 36.4 (19 Mar 2021 08:00), Max: 36.9 (18 Mar 2021 23:16)  T(F): 97.6 (19 Mar 2021 08:00), Max: 98.4 (18 Mar 2021 23:16)  HR: 68 (19 Mar 2021 08:00) (68 - 79)  BP: 108/70 (19 Mar 2021 08:00) (108/70 - 135/74)  BP(mean): --  RR: 18 (19 Mar 2021 08:00) (18 - 18)  SpO2: 96% (19 Mar 2021 08:00) (95% - 96%)    PHYSICAL EXAM:  R facial redness and firm induration.  Ear/Nose/Throat: no oral lesions, 	  Respiratory: clear BL  Cardiovascular: S1S2  Gastrointestinal:soft, (+) BS, no tenderness  Extremities:no e/e/c  No Lymphadenopathy    LABS:                        13.8   9.75  )-----------( 199      ( 18 Mar 2021 07:26 )             40.8     03-18    137  |  100  |  14  ----------------------------<  98  3.9   |  22  |  1.12    Ca    9.2      18 Mar 2021 07:23  TPro  7.6  /  Alb  4.0  /  TBili  0.5  /  DBili  x   /  AST  15  /  ALT  23  /  AlkPhos  68  03-17  PT/INR - ( 17 Mar 2021 12:19 )   PT: 12.8 sec;   INR: 1.07 ratio    PTT - ( 17 Mar 2021 12:19 )  PTT:32.0 sec    Imp/rx:  Purulent cellulitis of the face.    He's a little better but the area is starting to 'organize' and is firm, red, tender.  Would like to have surgical consult--perhaps ENT or Plastics.

## 2021-03-20 ENCOUNTER — TRANSCRIPTION ENCOUNTER (OUTPATIENT)
Age: 41
End: 2021-03-20

## 2021-03-20 PROCEDURE — 80048 BASIC METABOLIC PNL TOTAL CA: CPT

## 2021-03-20 PROCEDURE — 83036 HEMOGLOBIN GLYCOSYLATED A1C: CPT

## 2021-03-20 PROCEDURE — 96366 THER/PROPH/DIAG IV INF ADDON: CPT

## 2021-03-20 PROCEDURE — 87640 STAPH A DNA AMP PROBE: CPT

## 2021-03-20 PROCEDURE — 85027 COMPLETE CBC AUTOMATED: CPT

## 2021-03-20 PROCEDURE — 70487 CT MAXILLOFACIAL W/DYE: CPT

## 2021-03-20 PROCEDURE — 80202 ASSAY OF VANCOMYCIN: CPT

## 2021-03-20 PROCEDURE — 87040 BLOOD CULTURE FOR BACTERIA: CPT

## 2021-03-20 PROCEDURE — 96365 THER/PROPH/DIAG IV INF INIT: CPT

## 2021-03-20 PROCEDURE — 85025 COMPLETE CBC W/AUTO DIFF WBC: CPT

## 2021-03-20 PROCEDURE — 85610 PROTHROMBIN TIME: CPT

## 2021-03-20 PROCEDURE — 86769 SARS-COV-2 COVID-19 ANTIBODY: CPT

## 2021-03-20 PROCEDURE — 87641 MR-STAPH DNA AMP PROBE: CPT

## 2021-03-20 PROCEDURE — U0005: CPT

## 2021-03-20 PROCEDURE — U0003: CPT

## 2021-03-20 PROCEDURE — 80053 COMPREHEN METABOLIC PANEL: CPT

## 2021-03-20 PROCEDURE — 99285 EMERGENCY DEPT VISIT HI MDM: CPT

## 2021-03-20 PROCEDURE — 99232 SBSQ HOSP IP/OBS MODERATE 35: CPT

## 2021-03-20 PROCEDURE — 96367 TX/PROPH/DG ADDL SEQ IV INF: CPT

## 2021-03-20 PROCEDURE — 85730 THROMBOPLASTIN TIME PARTIAL: CPT

## 2021-03-20 PROCEDURE — 96375 TX/PRO/DX INJ NEW DRUG ADDON: CPT

## 2021-03-20 RX ORDER — MUPIROCIN 20 MG/G
1 OINTMENT TOPICAL
Qty: 2 | Refills: 0
Start: 2021-03-20

## 2021-03-20 RX ORDER — MUPIROCIN 20 MG/G
1 OINTMENT TOPICAL
Qty: 0 | Refills: 0 | DISCHARGE
Start: 2021-03-20

## 2021-03-20 RX ORDER — LINEZOLID 600 MG/300ML
1 INJECTION, SOLUTION INTRAVENOUS
Qty: 20 | Refills: 0
Start: 2021-03-20 | End: 2021-03-29

## 2021-03-20 RX ADMIN — Medication 0.5 MILLIGRAM(S): at 10:42

## 2021-03-20 RX ADMIN — OXYCODONE HYDROCHLORIDE 10 MILLIGRAM(S): 5 TABLET ORAL at 09:09

## 2021-03-20 RX ADMIN — Medication 15 MILLIGRAM(S): at 10:42

## 2021-03-20 RX ADMIN — MUPIROCIN 1 APPLICATION(S): 20 OINTMENT TOPICAL at 05:23

## 2021-03-20 RX ADMIN — LINEZOLID 300 MILLIGRAM(S): 600 INJECTION, SOLUTION INTRAVENOUS at 05:23

## 2021-03-20 RX ADMIN — OXYCODONE HYDROCHLORIDE 10 MILLIGRAM(S): 5 TABLET ORAL at 08:42

## 2021-03-20 RX ADMIN — Medication 15 MILLIGRAM(S): at 05:23

## 2021-03-20 RX ADMIN — Medication 15 MILLIGRAM(S): at 00:40

## 2021-03-20 RX ADMIN — Medication 0.5 MILLIGRAM(S): at 05:23

## 2021-03-20 NOTE — PROGRESS NOTE ADULT - PROVIDER SPECIALTY LIST ADULT
Infectious Disease
Internal Medicine
Internal Medicine
Infectious Disease
Infectious Disease
Internal Medicine

## 2021-03-20 NOTE — PROGRESS NOTE ADULT - ASSESSMENT
41 y/o M pmhx obesity, remote history of pilonidal cyst, recurrent abscess on scrotum presenting with concern for facial infection worsening for 3 days. Patient underwent I&D of his scrotal abscess which yielded MRSA (Vanco NAI= 1 Susc to clinda, TMP-Sx,Tetra, Linezolid. A few GNRs were present,  treated with Cefepime and LInezolid and discharged on po Cefpodoxime/Linezolid), 1/22/21 developed axillary abscess treated with bactrim, and most recently established care with Dr. Escalona, treated with minocycline, intranasal mupirocin and daily hibiclens showers for colonization of MRSA (instructed to perform for 7 days /mo for 3 months). Patient reports that 3 days ago he noticed swelling and pimple developing on the L side of his nose; he picked at the area and drained small amount of pus from it and started to resolve. Yesterday noticed another pimple developing in the same location next to his nose on the right side, which he also picked at and tried to drain and then this morning woke up with significant swelling and area that is hard and red above this pimple. He reports significant pain to the touch of the area and this morning swelling was significant enough that it was making it difficult for him to open his eye. He reports this is much better now than it was, has not taken any NSAIDs or any medication for pain. Last night had temp of 99.4 and chills, and when noted the area to be worsening this morning so was advised to go to ER by his infectious disease doctor .      Problem/Plan - 1:  ·  Problem: Facial cellulitis with purulent drainage .  Plan: S/P I&D . ON Linezolid now . Better .   With H/O Recurrent MRSA infection .   ID help appreciated .   Started on IV Vancomycin and now changed to  Linezolid .   Blood cultures and Nasal swab results noted.   Pain meds .   Normal  HgbA1c .      Problem/Plan - 2:  ·  Problem: Pain  .  Plan: Pain management helping.      Problem/Plan - 3:  ·  Problem: Anxiety .  Plan: Home dose Xanax .    Disposition L: DC planning home to follow up with ID as outpt.

## 2021-03-20 NOTE — DISCHARGE NOTE PROVIDER - NSDCMRMEDTOKEN_GEN_ALL_CORE_FT
ALPRAZolam 1 mg oral tablet: 1 tab(s) orally 4 times a day  mupirocin 2% topical ointment: 1 application topically 2 times a day  Zyvox 600 mg oral tablet: 1 tab(s) orally every 12 hours    ALPRAZolam 1 mg oral tablet: 1 tab(s) orally 4 times a day  Bactroban 2% nasal ointment: 1 applicator nasal 2 times a day  x 2 weeks, to both nostrils  mupirocin 2% topical ointment: 1 application topically 2 times a day  x 3 days-to incision site on face  Zyvox 600 mg oral tablet: 1 tab(s) orally every 12 hours    ALPRAZolam 1 mg oral tablet: 1 tab(s) orally 4 times a day  mupirocin 2% topical ointment: 1 application topically 2 times a day  x 3 days-to incision site on face  and also bilateral nostrils twice daily x 2 weeks  Zyvox 600 mg oral tablet: 1 tab(s) orally every 12 hours

## 2021-03-20 NOTE — PROGRESS NOTE ADULT - SUBJECTIVE AND OBJECTIVE BOX
Date of Service  : 03-20-21 @ 10:54    INTERVAL HPI/OVERNIGHT EVENTS: Infectous disease doctor told me that I can go home. I feel fine.   Vital Signs Last 24 Hrs  T(C): 36.6 (19 Mar 2021 23:40), Max: 36.6 (19 Mar 2021 16:10)  T(F): 97.8 (19 Mar 2021 23:40), Max: 97.8 (19 Mar 2021 16:10)  HR: 74 (19 Mar 2021 23:40) (73 - 74)  BP: 119/71 (19 Mar 2021 23:40) (119/71 - 123/72)  BP(mean): --  RR: 18 (19 Mar 2021 23:40) (18 - 18)  SpO2: 96% (19 Mar 2021 23:40) (96% - 99%)  I&O's Summary    19 Mar 2021 07:01  -  20 Mar 2021 07:00  --------------------------------------------------------  IN: 300 mL / OUT: 0 mL / NET: 300 mL      MEDICATIONS  (STANDING):  ALPRAZolam 0.5 milliGRAM(s) Oral every 6 hours  enoxaparin Injectable 40 milliGRAM(s) SubCutaneous daily  influenza   Vaccine 0.5 milliLiter(s) IntraMuscular once  ketorolac   Injectable 15 milliGRAM(s) IV Push every 6 hours  linezolid  IVPB      linezolid  IVPB 600 milliGRAM(s) IV Intermittent every 12 hours  mupirocin 2% Nasal 1 Application(s) Nasal two times a day  mupirocin 2% Ointment 1 Application(s) Topical two times a day    MEDICATIONS  (PRN):  oxyCODONE    IR 10 milliGRAM(s) Oral every 6 hours PRN Severe Pain (7 - 10)    LABS:              CAPILLARY BLOOD GLUCOSE              REVIEW OF SYSTEMS:  CONSTITUTIONAL: No fever, weight loss, or fatigue  EYES: No eye pain, visual disturbances, or discharge  ENMT:  No difficulty hearing, tinnitus, vertigo; No sinus or throat pain  NECK: No pain or stiffness  RESPIRATORY: No cough, wheezing, chills or hemoptysis; No shortness of breath  CARDIOVASCULAR: No chest pain, palpitations, dizziness, or leg swelling  GASTROINTESTINAL: No abdominal or epigastric pain. No nausea, vomiting, or hematemesis; No diarrhea or constipation. No melena or hematochezia.  GENITOURINARY: No dysuria, frequency, hematuria, or incontinence  NEUROLOGICAL: No headaches, memory loss, loss of strength, numbness, or tremors      Consultant(s) Notes Reviewed:  [x ] YES  [ ] NO    PHYSICAL EXAM:  GENERAL: NAD, Obese   HEAD:  Atraumatic, Normocephalic  EYES: EOMI, PERRLA, conjunctiva and sclera clear  ENMT: No tonsillar erythema, exudates, or enlargement; Moist mucous membranes, Good dentition, No lesions  NECK: Supple, No JVD, Normal thyroid  NERVOUS SYSTEM:  Alert & Oriented X3, No focal deficit   CHEST/LUNG: Good air entry bilateral with no  rales, rhonchi, wheezing, or rubs  HEART: Regular rate and rhythm; No murmurs, rubs, or gallops  ABDOMEN: Soft, Nontender, Nondistended; Bowel sounds present  EXTREMITIES:  2+ Peripheral Pulses, No clubbing, cyanosis, or edema  SKIN:Facial erythema and swelling less     Care Discussed with Consultants/Other Providers [ x] YES  [ ] NO

## 2021-03-20 NOTE — DISCHARGE NOTE NURSING/CASE MANAGEMENT/SOCIAL WORK - PATIENT PORTAL LINK FT
You can access the FollowMyHealth Patient Portal offered by [facility] by registering at the following website: [email or web address]. By joining [facility]’s FollowMyHealth portal, you will also be able to view your health information using other applications (apps) compatible with our system.

## 2021-03-20 NOTE — PROGRESS NOTE ADULT - REASON FOR ADMISSION
Rt facial boil and cellulitis

## 2021-03-20 NOTE — DISCHARGE NOTE PROVIDER - NSDCCPCAREPLAN_GEN_ALL_CORE_FT
PRINCIPAL DISCHARGE DIAGNOSIS  Diagnosis: Facial cellulitis  Assessment and Plan of Treatment: Due to MRSA infection. S/P incision and drainge done by ENT. Continue antibiotic as recommended. Follow up with Dr Harrison in ID office  Take all of your antibiotics as ordered.  Call your Health Care Provider within two days of arriving home to make a follow up appointment within one week.  If the affected cellulitic area increases in redness, warmth, pain or swelling call your Health Care Provider.  If you develop fever, chills, and/or malaise, call your Health Care Provider.  Continue home dose of percocet for pain         PRINCIPAL DISCHARGE DIAGNOSIS  Diagnosis: Facial cellulitis  Assessment and Plan of Treatment: Due to MRSA infection. S/P incision and drainge done by ENT. Continue antibiotic as recommended. Follow up with Dr Harrison in ID office  Take all of your antibiotics as ordered.  Call your Health Care Provider within two days of arriving home to make a follow up appointment within one week.  If the affected cellulitic area increases in redness, warmth, pain or swelling call your Health Care Provider.  If you develop fever, chills, and/or malaise, call your Health Care Provider.  Continue home dose of percocet for pain        SECONDARY DISCHARGE DIAGNOSES  Diagnosis: Anxiety  Assessment and Plan of Treatment: Continue xanax

## 2021-03-20 NOTE — PROGRESS NOTE ADULT - SUBJECTIVE AND OBJECTIVE BOX
Patient is a 40y old  Male who presents with a chief complaint of Rt facial boil and cellulitis (19 Mar 2021 19:16)    Being followed by ID for facial cellulitis    Interval history:  S/P bedside I&D by ENT  No acute events      ROS:  Improved facial pain  No cough, SOB, CP  No N/V/D./abd pain  No other complaints      Antimicrobials:    linezolid  IVPB      linezolid  IVPB 600 milliGRAM(s) IV Intermittent every 12 hours      Vital Signs Last 24 Hrs  T(C): 36.6 (03-19-21 @ 23:40), Max: 36.6 (03-19-21 @ 16:10)  T(F): 97.8 (03-19-21 @ 23:40), Max: 97.8 (03-19-21 @ 16:10)  HR: 74 (03-19-21 @ 23:40) (73 - 74)  BP: 119/71 (03-19-21 @ 23:40) (119/71 - 123/72)  BP(mean): --  RR: 18 (03-19-21 @ 23:40) (18 - 18)  SpO2: 96% (03-19-21 @ 23:40) (96% - 99%)    Physical Exam:    Constitutional Obese, NAD    HEENT EOMI, No pallor or icterus, erythema, induration    Neck supple no JVD or LN    Chest Clear to auscultation    CVS S1 S2 WNl No murmur or rub or gallop    Abd soft BS normal No tenderness no masses    Ext No cyanosis clubbing or edema    IV site no erythema tenderness or discharge    Joints no swelling or LOM    CNS AAO X 3 non focal    Lab Data:        .Blood Blood  03-17-21   No growth to date.  --  --    Complete Blood Count in AM (03.18.21 @ 07:26)   Nucleated RBC: 0 /100 WBCs   WBC Count: 9.75 K/uL   RBC Count: 4.47 M/uL   Hemoglobin: 13.8 g/dL   Hematocrit: 40.8 %   Mean Cell Volume: 91.3 fl   Mean Cell Hemoglobin: 30.9 pg   Mean Cell Hemoglobin Conc: 33.8 gm/dL   Red Cell Distrib Width: 12.9 %   Platelet Count - Automated: 199 K/uL < from: CT Maxillofacial w/ IV Cont (03.17.21 @ 15:21) >  EXAM:  CT MAXILLOFACIAL  IC                            PROCEDURE DATE:  03/17/2021            INTERPRETATION:  CLINICAL INDICATION: Right-sided face indurated, erythema and tender with MRSA colonization.    MAXILLOFACIAL CT:    TECHNIQUE: This examination consists of axial 1.3 mm sections from the frontal sinuses through the maxilla.  The study was supplemented with coronal and sagittal reformatted images.  Dedicated 3-D images were made using dedicated software and viewed on a dedicated 3-D workstation in multiple planes.    Intravenous contrast was not administered.    FINDINGS:    There is anterior infraorbital soft tissue thickening with some mild adjacent inflammatory stranding stenting down to the mandible. No abscess. There is no adjacent bone erosion. There is no post septal involvement in the orbit.    Evaluation of the osseous structures demonstrates no evidence of fracture.    The visualized sinuses demonstrate no evidence of opacification or mucosal thickening.    The nasopharynx is normal in appearance.    The parotid glands are normal and symmetric in appearance.    The  space is normal bilaterally.    Parapharyngeal space is normal bilaterally.    The tongue base is normal in appearance.    The epiglottis isnormal in thickness and appearance.    [place] tonsils are normal in appearance.    Sternocleidomastoid muscles are normal and symmetric in appearance bilaterally where visualized.    No suspicious adenopathy.      IMPRESSION:    There is a mild anterior intraorbital soft tissue thickening with mild adjacent inflammatory stranding extending down to the level of the mandible. No abscess. No adjacent bone erosion.                  < end of copied text >  MRSA PCR Result.: Detected: MRSA/MSSA PCR assay is a qualitative in vitro diagnostic test for the           Vancomycin Level, Trough: <4.0 ug/mL (03-18-21 @ 08:30)             Patient is a 40y old  Male who presents with a chief complaint of Rt facial boil and cellulitis (19 Mar 2021 19:16)    Being followed by ID for facial cellulitis    Interval history:  S/P bedside I&D by ENT  No acute events      ROS:  Improved facial pain  No cough, SOB, CP  No N/V/D./abd pain  No other complaints      Antimicrobials:    linezolid  IVPB      linezolid  IVPB 600 milliGRAM(s) IV Intermittent every 12 hours      Vital Signs Last 24 Hrs  T(C): 36.6 (03-19-21 @ 23:40), Max: 36.6 (03-19-21 @ 16:10)  T(F): 97.8 (03-19-21 @ 23:40), Max: 97.8 (03-19-21 @ 16:10)  HR: 74 (03-19-21 @ 23:40) (73 - 74)  BP: 119/71 (03-19-21 @ 23:40) (119/71 - 123/72)  BP(mean): --  RR: 18 (03-19-21 @ 23:40) (18 - 18)  SpO2: 96% (03-19-21 @ 23:40) (96% - 99%)    Physical Exam:    Constitutional Obese, NAD    HEENT EOMI, No pallor or icterus. Erythema, induration around R nares    Neck supple no JVD or LN    Chest Clear to auscultation    CVS S1 S2 WNl No murmur or rub or gallop    Abd soft BS normal No tenderness no masses    Ext No cyanosis clubbing or edema    IV site no erythema tenderness or discharge    Joints no swelling or LOM    CNS AAO X 3 non focal    Lab Data:        .Blood Blood  03-17-21   No growth to date.  --  --    Complete Blood Count in AM (03.18.21 @ 07:26)   Nucleated RBC: 0 /100 WBCs   WBC Count: 9.75 K/uL   RBC Count: 4.47 M/uL   Hemoglobin: 13.8 g/dL   Hematocrit: 40.8 %   Mean Cell Volume: 91.3 fl   Mean Cell Hemoglobin: 30.9 pg   Mean Cell Hemoglobin Conc: 33.8 gm/dL   Red Cell Distrib Width: 12.9 %   Platelet Count - Automated: 199 K/uL < from: CT Maxillofacial w/ IV Cont (03.17.21 @ 15:21) >  EXAM:  CT MAXILLOFACIAL  IC                            PROCEDURE DATE:  03/17/2021            INTERPRETATION:  CLINICAL INDICATION: Right-sided face indurated, erythema and tender with MRSA colonization.    MAXILLOFACIAL CT:    TECHNIQUE: This examination consists of axial 1.3 mm sections from the frontal sinuses through the maxilla.  The study was supplemented with coronal and sagittal reformatted images.  Dedicated 3-D images were made using dedicated software and viewed on a dedicated 3-D workstation in multiple planes.    Intravenous contrast was not administered.    FINDINGS:    There is anterior infraorbital soft tissue thickening with some mild adjacent inflammatory stranding stenting down to the mandible. No abscess. There is no adjacent bone erosion. There is no post septal involvement in the orbit.    Evaluation of the osseous structures demonstrates no evidence of fracture.    The visualized sinuses demonstrate no evidence of opacification or mucosal thickening.    The nasopharynx is normal in appearance.    The parotid glands are normal and symmetric in appearance.    The  space is normal bilaterally.    Parapharyngeal space is normal bilaterally.    The tongue base is normal in appearance.    The epiglottis isnormal in thickness and appearance.    [place] tonsils are normal in appearance.    Sternocleidomastoid muscles are normal and symmetric in appearance bilaterally where visualized.    No suspicious adenopathy.      IMPRESSION:    There is a mild anterior intraorbital soft tissue thickening with mild adjacent inflammatory stranding extending down to the level of the mandible. No abscess. No adjacent bone erosion.                  < end of copied text >  MRSA PCR Result.: Detected: MRSA/MSSA PCR assay is a qualitative in vitro diagnostic test for the           Vancomycin Level, Trough: <4.0 ug/mL (03-18-21 @ 08:30)

## 2021-03-20 NOTE — DISCHARGE NOTE PROVIDER - CARE PROVIDER_API CALL
Carlos Harrison)  Internal Medicine  [address] [place] [address]  Phone: [phone]  Fax: [phone]  Follow Up Time:    Carlos Harrison)  Internal Medicine  [address] [place] [id number]  Phone: [phone]  Fax: [phone]  Follow Up Time:     Diane Parr)  Otolaryngology Facial Plastics  [address] [place] [address] [address]  [place] [address]  Phone: [phone]  Fax: [phone]  Follow Up Time:

## 2021-03-20 NOTE — DISCHARGE NOTE PROVIDER - HOSPITAL COURSE
with concern for facial infection worsening for 3 days. Patient underwent I&D of his scrotal abscess which yielded MRSA (Vanco NAI= 1 Susc to clinda, TMP-Sx,Tetra, Linezolid. A few   GNRs were present,  treated with Cefepime and LInezolid and discharged on po Cefpodoxime/Linezolid), 1/22/21 developed axillary abscess treated with bactrim, and most recently   established care with Dr. aHrrison, treated with minocycline, intranasal mupirocin and daily hibiclens showers for colonization of MRSA (instructed to perform for 7 days /mo for 3 months).   Patient reports that 3 days PTA he noticed swelling and pimple developing on the L side of his nose; he picked at the area and drained small amount of pus from it and started to resolve.   Yesterday noticed another pimple developing in the same location next to his nose on the right side, which he also picked at and tried to drain and then this morning woke up with significant   swelling and area that is hard and red above this pimple. He reports significant pain to the touch of the area and this morning swelling was significant enough that it was making it difficult for   him to open his eye. He reports this is much better now than it was, has not taken any NSAIDs or any medication for pain. On night of admission had temp of 99.4 and chills, and when   noted the area to be worsening in the morning so was advised to go to ER by his infectious disease doctor . Started on Vancomycin then changed to Zyvoxx IV. Pain management was consulted   and Toradol was started. S/P I&D at bedside by ENT. Now pain and swelling improved. ID cleared for discharge home on 10 more days of Zyvox.         41 y/o M pmhx obesity, remote history of pilonidal cyst, recurrent abscess on scrotum presenting   with concern for facial infection worsening for 3 days. Patient underwent I&D of his scrotal abscess which yielded MRSA (Vanco NAI= 1 Susc to clinda, TMP-Sx,Tetra, Linezolid. A few   GNRs were present,  treated with Cefepime and LInezolid and discharged on po Cefpodoxime/Linezolid), 1/22/21 developed axillary abscess treated with bactrim, and most recently   established care with Dr. Harrison, treated with minocycline, intranasal mupirocin and daily hibiclens showers for colonization of MRSA (instructed to perform for 7 days /mo for 3 months).   Patient reports that 3 days PTA he noticed swelling and pimple developing on the L side of his nose; he picked at the area and drained small amount of pus from it and started to resolve.   Yesterday noticed another pimple developing in the same location next to his nose on the right side, which he also picked at and tried to drain and then this morning woke up with significant   swelling and area that is hard and red above this pimple. He reports significant pain to the touch of the area and this morning swelling was significant enough that it was making it difficult for   him to open his eye. He reports this is much better now than it was, has not taken any NSAIDs or any medication for pain. On night of admission had temp of 99.4 and chills, and when   noted the area to be worsening in the morning so was advised to go to ER by his infectious disease doctor . Started on Vancomycin then changed to Zyvoxx IV. Pain management was consulted   and Toradol was started. S/P I&D at bedside by ENT. Now pain and swelling improved. ID cleared for discharge home on 10 more days of Zyvox.         41 y/o M pmhx obesity, remote history of pilonidal cyst, recurrent abscess on scrotum presenting with concern for facial infection worsening for 3 days. Patient underwent I&D of his scrotal abscess which yielded MRSA (Vanco NAI= 1 Susc to clinda, TMP-Sx,Tetra, Linezolid. A few  GNRs were present,  treated with Cefepime and LInezolid and discharged on po Cefpodoxime/Linezolid), 1/22/21 developed axillary abscess treated with bactrim, and most recently established care with Dr. Harrison, treated with minocycline, intranasal mupirocin and daily hibiclens showers for colonization of MRSA (instructed to perform for 7 days /mo for 3 months).   Patient reports that 3 days PTA he noticed swelling and pimple developing on the L side of his nose; he picked at the area and drained small amount of pus from it and started to resolve.   Yesterday noticed another pimple developing in the same location next to his nose on the right side, which he also picked at and tried to drain and then this morning woke up with significant   swelling and area that is hard and red above this pimple. He reports significant pain to the touch of the area and this morning swelling was significant enough that it was making it difficult for   him to open his eye. He reports this is much better now than it was, has not taken any NSAIDs or any medication for pain. On night of admission had temp of 99.4 and chills, and when   noted the area to be worsening in the morning so was advised to go to ER by his infectious disease doctor . Started on Vancomycin then changed to Zyvoxx IV. Pain management was consulted   and Toradol was started. S/P I&D at bedside by ENT. Now pain and swelling improved. ID cleared for discharge home on 10 more days of Zyvox. Medication reconciliation DW Dr Newberry.

## 2021-03-20 NOTE — PROGRESS NOTE ADULT - ASSESSMENT
41 y/o M pmhx obesity, remote history of pilonidal cyst, recurrent abscess on scrotum presenting with concern for facial infection worsening for 3 days. Patient underwent I&D of his scrotal abscess which yielded MRSA (Vanco NAI= 1 Susc to clinda, TMP-Sx,Tetra, Linezolid. A few GNRs were present,  treated with Cefepime and LInezolid and discharged on po Cefpodoxime/Linezolid), 1/22/21 developed axillary abscess treated with bactrim, and most recently established care with Dr. Harrison, treated with minocycline, intranasal mupirocin and daily hibiclens showers for colonization of MRSA (instructed to perform for 7 days /mo for 3 months). Patient reports that 3 days PTA he noticed swelling and pimple developing on the L side of his nose; he picked at the area and drained small amount of pus from it and started to resolve. Yesterday noticed another pimple developing in the same location next to his nose on the right side, which he also picked at and tried to drain and then this morning woke up with significant swelling and area that is hard and red above this pimple. He reports significant pain to the touch of the area and this morning swelling was significant enough that it was making it difficult for him to open his eye. He reports this is much better now than it was, has not taken any NSAIDs or any medication for pain. On night of admission had temp of 99.4 and chills, and when noted the area to be worsening in the morning so was advised to go to ER by his infectious disease doctor .      Problem/Plan - 1:  ·  Problem: Facial cellulitis with purulent drainage .  Plan: S/P I&D . ON Linezolid now .   With H/O Recurrent MRSA infection .   Started on IV Vancomycin and now changed to  Linezolid due to PK issues    Blood cultures and Nasal swab results noted.   Pain meds .   Normal  HgbA1c .   Plan is to complete therapy with oral linezolid 600 mg po twice daily for additional 10 days, can be completed in OPD  F/U with DR Carlos Harrison in ID clinic     Problem/Plan - 2:  ·  Problem: Pain  .  Plan: Pain management helping.      Problem/Plan - 3:  ·  Problem: Anxiety .  Plan: Home dose Xanax    39 y/o M pmhx obesity, remote history of pilonidal cyst, recurrent abscess on scrotum presenting with concern for facial infection worsening for 3 days. Patient underwent I&D of his scrotal abscess which yielded MRSA (Vanco NAI= 1 Susc to clinda, TMP-Sx,Tetra, Linezolid. A few GNRs were present,  treated with Cefepime and LInezolid and discharged on po Cefpodoxime/Linezolid), 1/22/21 developed axillary abscess treated with bactrim, and most recently established care with Dr. Harrison, treated with minocycline, intranasal mupirocin and daily hibiclens showers for colonization of MRSA (instructed to perform for 7 days /mo for 3 months). Patient reports that 3 days PTA he noticed swelling and pimple developing on the L side of his nose; he picked at the area and drained small amount of pus from it and started to resolve. Yesterday noticed another pimple developing in the same location next to his nose on the right side, which he also picked at and tried to drain and then this morning woke up with significant swelling and area that is hard and red above this pimple. He reports significant pain to the touch of the area and this morning swelling was significant enough that it was making it difficult for him to open his eye. He reports this is much better now than it was, has not taken any NSAIDs or any medication for pain. On night of admission had temp of 99.4 and chills, and when noted the area to be worsening in the morning so was advised to go to ER by his infectious disease doctor .      Problem/Plan - 1:  ·  Problem: Facial cellulitis with purulent drainage .  Plan: S/P I&D . ON Linezolid now .   With H/O Recurrent MRSA infection .   Started on IV Vancomycin and now changed to  Linezolid due to PK issues    Blood cultures and Nasal swab results noted.   Pain meds .   Normal  HgbA1c .   Plan is to complete therapy with oral linezolid 600 mg po twice daily for additional 10 days, can be completed in OPD  F/U with DR Carlos Harrison in ID clinic     Problem/Plan - 2:  ·  Problem: Pain  .  Plan: Pain management helping.      Problem/Plan - 3:  ·  Problem: Anxiety .  Plan: Home dose Xanax     Discussed with covering NP, Elise

## 2021-03-20 NOTE — DISCHARGE NOTE PROVIDER - NSDCFUADDAPPT_GEN_ALL_CORE_FT
Patient should follow up in ENT office as an outpatient. May see Dr. Keshav Gilliam or Jose. Call [phone].  S/P I&D with 18 gauge needle on 3/19 by ENT  very minimal purulence expressed - likely almost all cellulitis.  Apply mupirocin to i&d sites directly for at least 3 days.  Application to b/l nares for 2 weeks or longer if desired by ID to eradicate colonization.  Follow up with ID

## 2021-03-20 NOTE — DISCHARGE NOTE NURSING/CASE MANAGEMENT/SOCIAL WORK - NSDCFUADDAPPT_GEN_ALL_CORE_FT
Patient should follow up in ENT office as an outpatient. May see Dr. Keshav Gilliam or Jose. Call [phone].  S/P I&D with 18 gauge needle on 3/19 by ENT  very minimal purulence expressed - likely almost all cellulitis.  Apply mupirocin to i&d sites directly for at least 3 days.  Application to b/l nares for 2 weeks or longer if desired by ID to eradicate colonization.  Follow up with ID

## 2021-03-20 NOTE — DISCHARGE NOTE PROVIDER - PROVIDER TOKENS
PROVIDER:[TOKEN:[[id number]]] PROVIDER:[TOKEN:[[id number]]],PROVIDER:[TOKEN:[[id number]]]

## 2021-03-20 NOTE — DISCHARGE NOTE PROVIDER - CARE PROVIDERS DIRECT ADDRESSES
,[email or web address]

## 2021-03-22 LAB
CULTURE RESULTS: SIGNIFICANT CHANGE UP
CULTURE RESULTS: SIGNIFICANT CHANGE UP
SPECIMEN SOURCE: SIGNIFICANT CHANGE UP
SPECIMEN SOURCE: SIGNIFICANT CHANGE UP

## 2021-03-23 ENCOUNTER — NON-APPOINTMENT (OUTPATIENT)
Age: 41
End: 2021-03-23

## 2021-03-29 PROBLEM — A49.02 METHICILLIN RESISTANT STAPHYLOCOCCUS AUREUS INFECTION, UNSPECIFIED SITE: Chronic | Status: ACTIVE | Noted: 2021-03-17

## 2021-04-01 ENCOUNTER — APPOINTMENT (OUTPATIENT)
Dept: INFECTIOUS DISEASE | Facility: CLINIC | Age: 41
End: 2021-04-01
Payer: COMMERCIAL

## 2021-04-01 VITALS
HEART RATE: 70 BPM | OXYGEN SATURATION: 97 % | WEIGHT: 315 LBS | HEIGHT: 70 IN | DIASTOLIC BLOOD PRESSURE: 83 MMHG | TEMPERATURE: 98.6 F | BODY MASS INDEX: 45.1 KG/M2 | SYSTOLIC BLOOD PRESSURE: 140 MMHG | RESPIRATION RATE: 16 BRPM

## 2021-04-01 DIAGNOSIS — E66.01 MORBID (SEVERE) OBESITY DUE TO EXCESS CALORIES: ICD-10-CM

## 2021-04-01 DIAGNOSIS — Z22.322 CARRIER OR SUSPECTED CARRIER OF METHICILLIN RESISTANT STAPHYLOCOCCUS AUREUS: ICD-10-CM

## 2021-04-01 PROCEDURE — 99213 OFFICE O/P EST LOW 20 MIN: CPT

## 2021-04-01 PROCEDURE — 99072 ADDL SUPL MATRL&STAF TM PHE: CPT

## 2021-04-01 RX ORDER — MINOCYCLINE HYDROCHLORIDE 100 MG/1
100 TABLET ORAL TWICE DAILY
Qty: 14 | Refills: 0 | Status: DISCONTINUED | COMMUNITY
Start: 2021-02-02 | End: 2021-04-01

## 2021-04-01 NOTE — PHYSICAL EXAM
[General Appearance - Alert] : alert [General Appearance - In No Acute Distress] : in no acute distress [PERRL With Normal Accommodation] : pupils were equal in size, round, reactive to light [Sclera] : the sclera and conjunctiva were normal [Extraocular Movements] : extraocular movements were intact [Outer Ear] : the ears and nose were normal in appearance [Oropharynx] : the oropharynx was normal with no thrush [Respiration, Rhythm And Depth] : normal respiratory rhythm and effort [Exaggerated Use Of Accessory Muscles For Inspiration] : no accessory muscle use [Edema] : there was no peripheral edema [Bowel Sounds] : normal bowel sounds [Abdomen Soft] : soft [Abdomen Tenderness] : non-tender [Skin Color & Pigmentation] : normal skin color and pigmentation [] : no rash [Oriented To Time, Place, And Person] : oriented to person, place, and time [Affect] : the affect was normal [FreeTextEntry1] : [place]

## 2021-04-01 NOTE — HISTORY OF PRESENT ILLNESS
[FreeTextEntry1] : Mr Flor was just Hospitalized [facility] 3/17 --> 3/20/21 for MRSA purulent cellulitis of right face\par Rx: Vanco 3/17-->18\par Linezolid 3/18 --> about 3/30\par \par 39 y/o M pmhx obesity, remote history of pilonidal cyst, recurrent abscess on scrotum presenting with concern for facial infection worsening for 3 days. Patient underwent I&D of his scrotal abscess which yielded MRSA (Vanco NAI= 1 Susc to clinda, TMP-Sx,Tetra, Linezolid. A few GNRs were present, treated with Cefepime and LInezolid and discharged on po Cefpodoxime/Linezolid), 1/22/21 developed axillary abscess treated with bactrim, and most recently established care with Dr. Harrison, treated with minocycline, intranasal mupirocin and daily hibiclens showers for colonization of MRSA (instructed to perform for 7 days /mo for 3 months). Patient reports that 3 days PTA he noticed swelling and pimple developing on the L side of his nose; he picked at the area and drained small amount of pus from it and started to resolve. Yesterday noticed another pimple developing in the same location next to his nose on the right side, which he also picked at and tried to drain and then this morning woke up with significant swelling and area that is hard and red above this pimple. He reports significant pain to the touch of the area and this morning swelling was significant enough that it was making it difficult for him to open his eye. He reports this is much better now than it was, has not taken any NSAIDs or any medication for pain. On night of admission had temp of 99.4 and chills, and when noted the area to be worsening in the morning.\par \par BMI = 50.4\par 3/18/21 HgbA1C= 5.5\par 3.17 BC neg x2\par 3/7/21 Nals Swab + MRSA by PCR\par \par Treated with IV vanco initially then after very low levels, changed to po Linezolid 3/18--> to be continued after discharge through about 3/30\par ENT performed bedside I&D on 3/18- unroofed with 18g needle -scant purulence\par \par He points out that this was his 3rd infection - the scrotal abscess and axillary pustule before. This occurred about one week after a round of Bactrim/Hibiclns/Muporicin in combination in an effort for eradication of MRSA colonization.\par \par He has been using Muporcin 3 times daily and taking daily Hibiclens showers with frequent laundering, He works from home, so does not wear a face mask except when he goes to store. He is more active with the warmer weather and has lost 5# since 2/2/21; current wieght= 334#, BMI = 47.92

## 2021-04-01 NOTE — ASSESSMENT
[Treatment Education] : treatment education [Rx Dose / Side Effects] : Rx dose/side effects [Nutritional / Food Issues] : nutritional/food issues [Medical Care Issues] : medical care issues [FreeTextEntry1] : MRSA colonization with recurrent pustules in scrotum, axilla and nasolabial fold --> cheek.\par \par Encouraged to continue weight loss\par Increase frequency of decolonization attempt to every other week including Bactrim:\par For every other week: Hibiclens showers, Intranasal Muporicin, coordinated laundering along with Bactrim DS twice daily\par to continue for 2 months\par \par if he should breakthrough with an additional infection: Linezolid 600 mg twice daily x 10 days\par \par adverse effects of Bactrim, including photosensitivity, were discussed.

## 2021-04-01 NOTE — CONSULT LETTER
[Dear  ___] : Dear  [unfilled], [Consult Letter:] : I had the pleasure of evaluating your patient, [unfilled]. [Please see my note below.] : Please see my note below. [Consult Closing:] : Thank you very much for allowing me to participate in the care of this patient.  If you have any questions, please do not hesitate to contact me. [Sincerely,] : Sincerely, [FreeTextEntry2] : Dr Gilles Gutierrez\par [address]\[place] [address]  [FreeTextEntry3] : Carlos Harrison MD, FACP, SOWMYA, AAHIVM\par Infectious Diseases\par [facility]

## 2021-04-01 NOTE — REASON FOR VISIT
[Post Hospitalization] : a post hospitalization visit [FreeTextEntry1] : recurrent mrsa skin  infections

## 2021-04-02 ENCOUNTER — NON-APPOINTMENT (OUTPATIENT)
Age: 41
End: 2021-04-02

## 2021-04-02 RX ORDER — SULFAMETHOXAZOLE AND TRIMETHOPRIM 800; 160 MG/1; MG/1
800-160 TABLET ORAL
Qty: 60 | Refills: 1 | Status: ACTIVE | COMMUNITY
Start: 2021-01-22 | End: 1900-01-01

## 2021-04-02 RX ORDER — LINEZOLID 600 MG/1
600 TABLET, FILM COATED ORAL
Qty: 20 | Refills: 0 | Status: ACTIVE | COMMUNITY
Start: 2021-04-02 | End: 1900-01-01

## 2021-05-25 ENCOUNTER — NON-APPOINTMENT (OUTPATIENT)
Age: 41
End: 2021-05-25

## 2021-06-21 ENCOUNTER — APPOINTMENT (OUTPATIENT)
Dept: INFECTIOUS DISEASE | Facility: CLINIC | Age: 41
End: 2021-06-21

## 2023-11-16 NOTE — ED ADULT NURSE NOTE - OBJECTIVE STATEMENT
I have read and agree with Casandra Bolaños RN's charting   39 yo M presents to ED A+OX3, ambulatory with steady gait c/o MRSA infection. Patient states he was treated with antibiotcs for an MRSA infection to his right groin and follows with infectious disease. States he completed a 7 week course of antibiotics, was off the antibiotics for 3 weeks and was supposed to start another 7 day course but has not started the medication. Patient states 3 days ago he noticed a pimple to the left side of his nose, states he noticed another one on the right side and woke up this morning with redness and swelling to the right side of his face on his cheek. Patient states when he woke up "it was hard to open my eye but it got better after a while." Endorses having chills last night, states he took temperature was 99.4F and did not take any motrin/tylenol last night or this morning. Patient noted to have congestion, states "that started this morning and I think it is from the swelling." Breathing spontaneous and unlabored on RA. Speaking in full sentences without difficulty. Skin warm pink and dry. Area under right eye is red, warm and firm to touch. Moves all extremities. Comfort and safety measures in place. Call bell within reach.

## 2024-02-29 ENCOUNTER — APPOINTMENT (OUTPATIENT)
Dept: ORTHOPEDIC SURGERY | Facility: CLINIC | Age: 44
End: 2024-02-29
Payer: COMMERCIAL

## 2024-02-29 DIAGNOSIS — M75.90 BURSITIS OF UNSPECIFIED SHOULDER: ICD-10-CM

## 2024-02-29 DIAGNOSIS — M75.50 BURSITIS OF UNSPECIFIED SHOULDER: ICD-10-CM

## 2024-02-29 PROCEDURE — 73030 X-RAY EXAM OF SHOULDER: CPT | Mod: LT

## 2024-02-29 PROCEDURE — 99204 OFFICE O/P NEW MOD 45 MIN: CPT | Mod: 25

## 2024-02-29 PROCEDURE — 20610 DRAIN/INJ JOINT/BURSA W/O US: CPT | Mod: LT

## 2024-02-29 NOTE — PHYSICAL EXAM
[de-identified] : Examination of the [left] shoulder reveals equal active and passive motion as compared to the contralateral side   There is a positive Speed, positive Sarah, positive South, tenderness with anterior shoulder compression [de-identified] : [4] views of [left] shoulder were obtained today in my office and were seen by me and discussed with the patient. These [show findings consistent with AC arthropathy and grossly preserved glenohumeral joint space]  MRI: At this point in time we have had a thorough review regarding the patient's shoulder MRI. We have discussed the rotator cuff pathology as well as the biceps pathology at the tendon and insertion site glenohumeral and articular pathology as well as well as subacromial bursitis and impingement related findings. The images were viewed and reviewed together with the patient.

## 2024-02-29 NOTE — ASSESSMENT
[FreeTextEntry1] : ASSESSMENT: The patient comes in today with chronic exacerbated left shoulder discomfort.  He describes difficulties with overhead activities as well as activities of daily living.  He presents today with left shoulder MRI for review.  Symptoms are consistent with left shoulder tendinopathy, impingement, bursitis, weakness.  MRI of the left shoulder was reviewed and thoroughly discussed with the patient.   MRI: At this point in time we have had a thorough review regarding the patient's shoulder MRI. We have discussed the rotator cuff pathology as well as the biceps pathology at the tendon and insertion site glenohumeral and articular pathology as well as well as subacromial bursitis and impingement related findings. The images were viewed and reviewed together with the patient. Treatment modalities were discussed, the patient elects for an injection.  I also recommend physical therapy for his condition.   The patient was adequately and thoroughly informed of my assessment of their current condition(s).  - This may diminish bodily function for the extremity.  We discussed prognosis, treatment modalities including operative and nonoperative options for the above diagnostic assessment. As always, 2nd opinion is always provided as an option. For this, when accessible, I was able to review other physicians note(s) including reviewing other tests, imaging results as well as personally view these results for my own interpretation.      [Left] SUBACROMIAL SHOULDER INJECTION     Indication:  subacromial bursitis/impingement, pain     Risk, benefits and alternatives were discussed with the patient. Potential complications include bleeding and infection. Alcohol was used to prep the area. Ethyl chloride spray was used as a topical anesthetic.  Using sterile technique, the injection needle was then directed from a standard posterior approach parallel to and inferior to the acromion.  A 21g needle was used to inject 5 mL of 1% Lidocaine and 2 mL Kenalog (10mg). No significant resistance was encountered. A bandage was applied. The patient tolerated the procedure well. Patient instructed to avoid strenuous activity for 2 day(s). Specifically counseled regarding the signs and symptoms of potential infection and instructed to present promptly to clinic or hospital if such signs and symptoms arise.   The patient was adequately and thoroughly informed of my assessment of their current condition(s).  DISCUSSION: 1.  Injection as above.  Activity modifications.  PT prescription provided.  Follow-up in 8 weeks. 2.  Left shoulder MRI reviewed. 3. [x]

## 2024-02-29 NOTE — HISTORY OF PRESENT ILLNESS
[FreeTextEntry1] : Boni is a pleasant 43-year-old male who presents today with chronic exacerbated left shoulder discomfort.  He describes difficulties with overhead activities as well as activities of daily living.  He presents today with left shoulder MRI for review. Mirvaso Counseling: Mirvaso is a topical medication which can decrease superficial blood flow where applied. Side effects are uncommon and include stinging, redness and allergic reactions.

## 2024-04-11 ENCOUNTER — APPOINTMENT (OUTPATIENT)
Dept: ORTHOPEDIC SURGERY | Facility: CLINIC | Age: 44
End: 2024-04-11
Payer: COMMERCIAL

## 2024-04-11 VITALS — WEIGHT: 255 LBS | HEIGHT: 70 IN | BODY MASS INDEX: 36.51 KG/M2

## 2024-04-11 PROCEDURE — 20610 DRAIN/INJ JOINT/BURSA W/O US: CPT | Mod: LT

## 2024-04-11 PROCEDURE — 99214 OFFICE O/P EST MOD 30 MIN: CPT | Mod: 25

## 2024-04-11 NOTE — HISTORY OF PRESENT ILLNESS
[FreeTextEntry1] : Boni is a pleasant 43-year-old male who presents today with chronic exacerbated left shoulder discomfort. He describes difficulties with overhead activities as well as activities of daily living. He presents today with left shoulder MRI for review.

## 2024-04-11 NOTE — PHYSICAL EXAM
[de-identified] : Examination of the [left] shoulder reveals equal active and passive motion as compared to the contralateral side There is a positive Speed, positive Sarah, positive South, tenderness with anterior shoulder compression, positive [place] [de-identified] :  [4] views of [left] shoulder were reviewed today in my office and were seen by me and discussed with the patient. These [show findings consistent with AC arthropathy and grossly preserved glenohumeral joint space] MRI: At this point in time we have had a thorough review regarding the patient's shoulder MRI. We have discussed the rotator cuff pathology as well as the biceps pathology at the tendon and insertion site glenohumeral and articular pathology as well as well as subacromial bursitis and impingement related findings. The images were viewed and reviewed together with the patient.

## 2024-04-25 ENCOUNTER — APPOINTMENT (OUTPATIENT)
Dept: ORTHOPEDIC SURGERY | Facility: CLINIC | Age: 44
End: 2024-04-25

## 2024-05-23 ENCOUNTER — APPOINTMENT (OUTPATIENT)
Dept: ORTHOPEDIC SURGERY | Facility: CLINIC | Age: 44
End: 2024-05-23
Payer: COMMERCIAL

## 2024-05-23 VITALS
HEART RATE: 76 BPM | HEIGHT: 70 IN | DIASTOLIC BLOOD PRESSURE: 74 MMHG | SYSTOLIC BLOOD PRESSURE: 114 MMHG | WEIGHT: 255 LBS | BODY MASS INDEX: 36.51 KG/M2

## 2024-05-23 DIAGNOSIS — M25.512 PAIN IN LEFT SHOULDER: ICD-10-CM

## 2024-05-23 DIAGNOSIS — M19.019 PRIMARY OSTEOARTHRITIS, UNSPECIFIED SHOULDER: ICD-10-CM

## 2024-05-23 DIAGNOSIS — M75.90 BURSITIS OF UNSPECIFIED SHOULDER: ICD-10-CM

## 2024-05-23 DIAGNOSIS — M75.50 BURSITIS OF UNSPECIFIED SHOULDER: ICD-10-CM

## 2024-05-23 PROCEDURE — 99214 OFFICE O/P EST MOD 30 MIN: CPT | Mod: 25

## 2024-05-23 PROCEDURE — 20610 DRAIN/INJ JOINT/BURSA W/O US: CPT | Mod: LT

## 2024-05-23 NOTE — HISTORY OF PRESENT ILLNESS
[FreeTextEntry1] : Boni is a pleasant 43-year-old male who presents today with chronic exacerbated left shoulder discomfort. He describes difficulties with overhead activities as well as activities of daily living.  We have previously reviewed left shoulder.  At this stage she has trialed physical therapy activity modification and prior injection with recurrence

## 2024-05-23 NOTE — ASSESSMENT
[FreeTextEntry1] : ASSESSMENT: The patient comes in today with chronic exacerbated left shoulder discomfort. He describes difficulties with overhead activities as well as activities of daily living.  We have previously discussed findings on MRI.  At this point in time he has failed injection and has trialed physical therapy as well however symptoms are quite severe and debilitating for him.  At this point in time we have discussed possibility of arthroscopic surgery.  Today we have discussed a repeat injection.  If symptoms persist we will discuss arthroscopic surgery once again MRI: At this point in time we have had a thorough review regarding the patient's shoulder MRI. We have discussed the rotator cuff pathology as well as the biceps pathology at the tendon and insertion site glenohumeral and articular pathology as well as well as subacromial bursitis and impingement related findings. The images were viewed and reviewed together with the patient. Treatment modalities were discussed, the patient elects for an injection. I also recommend physical therapy for his condition.  The patient was adequately and thoroughly informed of my assessment of their current condition(s). - This may diminish bodily function for the extremity. We discussed prognosis, treatment modalities including operative and nonoperative options for the above diagnostic assessment. As always, 2nd opinion is always provided as an option. For this, when accessible, I was able to review other physicians note(s) including reviewing other tests, imaging results as well as personally view these results for my own interpretation.  [Left] SUBACROMIAL SHOULDER INJECTION Indication: subacromial bursitis/impingement, pain Risk, benefits and alternatives were discussed with the patient. Potential complications include bleeding and infection. Alcohol was used to prep the area. Ethyl chloride spray was used as a topical anesthetic. Using sterile technique, the injection needle was then directed from a standard posterior approach parallel to and inferior to the acromion. A 21g needle was used to inject 5 mL of 1% Lidocaine and 2 mL Kenalog (10mg). No significant resistance was encountered. A bandage was applied. The patient tolerated the procedure well. Patient instructed to avoid strenuous activity for 2 day(s). Specifically counseled regarding the signs and symptoms of potential infection and instructed to present promptly to clinic or hospital if such signs and symptoms arise.  The patient was adequately and thoroughly informed of my assessment of their current condition(s).  DISCUSSION: 1. Injection as above. Activity modifications. PT. follow-up 10 weeks 2. Left shoulder MRI reviewed. 3. [x].

## 2024-05-23 NOTE — PHYSICAL EXAM
[de-identified] : Examination of the [left] shoulder reveals equal active and passive motion as compared to the contralateral side There is a positive Speed, positive Sarah, positive South, tenderness with anterior shoulder compression. 3+/5 strength [de-identified] :  [4] views of [left] shoulder were reviewed today in my office and were seen by me and discussed with the patient. These [show findings consistent with AC arthropathy and grossly preserved glenohumeral joint space] MRI: At this point in time we have had a thorough review regarding the patient's shoulder MRI. We have discussed the rotator cuff pathology as well as the biceps pathology at the tendon and insertion site glenohumeral and articular pathology as well as well as subacromial bursitis and impingement related findings. The images were viewed and reviewed together with the patient.

## 2024-08-09 ENCOUNTER — APPOINTMENT (OUTPATIENT)
Dept: ORTHOPEDIC SURGERY | Facility: CLINIC | Age: 44
End: 2024-08-09

## 2024-08-19 NOTE — ASSESSMENT
[FreeTextEntry1] : ASSESSMENT: The patient comes in today with chronic exacerbated left shoulder discomfort. He describes difficulties with overhead activities as well as activities of daily living.  We have previously discussed findings on MRI.  At this point in time he has failed injection and has trialed physical therapy as well however symptoms are quite severe and debilitating for him.  At this point in time we have discussed possibility of arthroscopic surgery.  Today we have discussed a repeat injection.  If symptoms persist we will discuss arthroscopic surgery once again MRI: At this point in time we have had a thorough review regarding the patient's shoulder MRI. We have discussed the rotator cuff pathology as well as the biceps pathology at the tendon and insertion site glenohumeral and articular pathology as well as well as subacromial bursitis and impingement related findings. The images were viewed and reviewed together with the patient. Treatment modalities were discussed, the patient elects for an injection. I also recommend physical therapy for his condition.  The patient was adequately and thoroughly informed of my assessment of their current condition(s). - This may diminish bodily function for the extremity. We discussed prognosis, treatment modalities including operative and nonoperative options for the above diagnostic assessment. As always, 2nd opinion is always provided as an option. For this, when accessible, I was able to review other physicians note(s) including reviewing other tests, imaging results as well as personally view these results for my own interpretation.  [Left] SUBACROMIAL SHOULDER INJECTION Indication: subacromial bursitis/impingement, pain Risk, benefits and alternatives were discussed with the patient. Potential complications include bleeding and infection. Alcohol was used to prep the area. Ethyl chloride spray was used as a topical anesthetic. Using sterile technique, the injection needle was then directed from a standard posterior approach parallel to and inferior to the acromion. A 21g needle was used to inject 5 mL of 1% Lidocaine and 2 mL Kenalog (10mg). No significant resistance was encountered. A bandage was applied. The patient tolerated the procedure well. Patient instructed to avoid strenuous activity for 2 day(s). Specifically counseled regarding the signs and symptoms of potential infection and instructed to present promptly to clinic or hospital if such signs and symptoms arise.  The patient was adequately and thoroughly informed of my assessment of their current condition(s).  DISCUSSION: 1. Injection as above. Activity modifications. PT. Follow-up 2 months 2. Left shoulder MRI reviewed. 3. [x]. pleasant, well nourished, well developed, in no acute distress , normal communication ability